# Patient Record
Sex: MALE | Race: BLACK OR AFRICAN AMERICAN | NOT HISPANIC OR LATINO | Employment: OTHER | URBAN - METROPOLITAN AREA
[De-identification: names, ages, dates, MRNs, and addresses within clinical notes are randomized per-mention and may not be internally consistent; named-entity substitution may affect disease eponyms.]

---

## 2017-05-15 ENCOUNTER — TELEMEDICINE (OUTPATIENT)
Dept: TELEMEDICINE | Facility: OTHER | Age: 82
End: 2017-05-15
Payer: OTHER GOVERNMENT

## 2017-05-15 DIAGNOSIS — H53.2 DIPLOPIA: ICD-10-CM

## 2017-05-15 PROCEDURE — 99203 OFFICE O/P NEW LOW 30 MIN: CPT | Mod: GT,,, | Performed by: PSYCHIATRY & NEUROLOGY

## 2017-05-15 NOTE — TELEMEDICINE CONSULT
[facility]/Vascular Neurology  Tele-Consult    Consultation started: 5/15/2017 at 4:06 PM   Consulting Provider:  Dr. Ybarra  The patient location is [facility] Emergency Department  Spoke hospital nurse at bedside with patient assisting consultant.     Subjective:   Subjective   History of Present Illness:  Nick Sellers is a 81 y.o. male who presents with complaints of sever dizziness and diplopia.  There were no clear triggers or precipitation factors. Symtpoms only happen when he stands up and improve when he sits or lays down.  No associated unilateral weakness.  Reports imbalance when that happens but no formal weakness.  Has had in the past and symptoms improve when he sits back down.  Currently he only feels double vision.    Wake up Stroke?: no  Last known normal:   this morining  Recent bleeding noted: no  Does the patient take any Blood Thinners? no           H&P was obtained from patient.  Past Medical History: hypertension, diabetes, high cholesterol, CAD, stroke and heart problems    Medications:   Current Outpatient Prescriptions:     albuterol 90 mcg/actuation inhaler, Inhale 2 puffs into the lungs every 6 (six) hours as needed for Wheezing or Shortness of Breath., Disp: 8.5 g, Rfl: 2    atorvastatin (LIPITOR) 40 MG tablet, Take 1 tablet (40 mg total) by mouth every evening., Disp: 30 tablet, Rfl: 2    chlorthalidone (HYGROTEN) 50 MG Tab, Take 100 mg by mouth once daily., Disp: , Rfl:     clopidogrel (PLAVIX) 75 mg tablet, Take 1 tablet (75 mg total) by mouth once daily., Disp: 30 tablet, Rfl: 2    donepezil (ARICEPT) 10 MG tablet, Take 10 mg by mouth 2 (two) times daily., Disp: , Rfl:     hydrALAZINE (APRESOLINE) 50 MG tablet, Take 50 mg by mouth 2 (two) times daily., Disp: , Rfl:     lisinopril (PRINIVIL,ZESTRIL) 40 MG tablet, Take 80 mg by mouth once daily., Disp: , Rfl:     meclizine (ANTIVERT) 12.5 mg tablet, Take 1 tablet (12.5 mg total) by mouth 3 (three) times daily  as needed for Dizziness., Disp: 90 tablet, Rfl: 2    metformin (GLUCOPHAGE) 500 MG tablet, Take 1 tablet (500 mg total) by mouth 2 (two) times daily with meals., Disp: 60 tablet, Rfl: 2    metoprolol tartrate (LOPRESSOR) 100 MG tablet, Take 100 mg by mouth 2 (two) times daily., Disp: , Rfl:     omega-3 fatty acids-vitamin E (FISH OIL) 1,000 mg Cap, Take 1,000 mg by mouth 2 (two) times daily., Disp: , Rfl:     oxybutynin (DITROPAN) 5 MG Tab, Take 5 mg by mouth 2 (two) times daily., Disp: , Rfl:     Allergies:   Review of patient's allergies indicates:   Allergen Reactions    Pcn [penicillins]      Patient states he has been paralyzed when given PCN in the past        Objective:     Vitals: There were no vitals filed for this visit.     Objective   Physical Exam:  General: well developed, well nourished   HENT: Head:normocephalic and atraumatic   HENT: Ears: right ear normal and left ear normal  Nose: normal nares and no discharge  Eyes:conjunctivae/corneas clear. PERRL.  Neck: normal, no obvious masses and trachea to midline  Lungs: normal respiratory effort  Cardiovascular: Heart: regular rate and rhythm   Cardiovascular: Extremities: no cyanosis, no edema and no clubbing  Abdomen: appears normal and not distended  Skin:  skin color and texture normal, no rash and no bruises  Musculoskeletal: normal range of motion in all extremities  Psych/Behavioral: appropriate affect       Imaging Notes: Abnormal CT chronic changes. Impression performed at: 4:05 pm    NIH Stroke Scale:  Interval: baseline (upon arrival/admit)  Level of Consciousness: 0 - alert  LOC Questions: 0 - answers both correctly  LOC Commands: 0 - performs both correctly  Best Gaze: 0 - normal  Visual: 0 - no visual loss  Facial Palsy: 0 - normal  Motor Left Arm: 0 - no drift  Motor Right Arm: 0 - no drift  Motor Left Le - no drift  Motor Right Le - no drift  Limb Ataxia: 0 - absent  Sensory: 0 - normal  Best Language: 0 - no  aphasia  Dysarthria: 0 - normal articulation  Extinction and Inattention: 0 - no neglect  NIH Stroke Scale Total: 0  Modified Simpson Scale:   Timeline: Prior to symptoms onset  Modified Tacho Score: 0 - no symptoms          Assessment - Diagnosis - Goals:     Plan     Diagnosis/Impression: double vision   Diplopia worse when looking to the left, may represent small brainstem stroke.    Altaplase Recommendation: Altaplase not recommended due to Outside of treament window    Recommendation: NPO until after pass dysphagia screen. Diagnostic studies: HgbA1C to assess blood glucose levels, Lipid Profile to assess cholesterol levels, MRA head to assess vasculature, MRA neck/arch to assess vasculature, MRI head without contrast to assess brain parenchyma, Trans-thoracic cardiac echo to assess cardiac function/status  Consults: Rehab Consult; Occupational Therapy, Rehab Consult; Physical Therapy and Rehab Consult; Speech Therapy  Antithrombotics: Aspirin: 325 mg oral daily  Clopidogrel: 75 mg oral daily  Statins: Atorvastatin- 40 mg oral daily    Disposition Recommendation:  transfer to [facility] by  ground  standard       Possible Interventional Revascularization Candidate? No; No large vessel occlusion    Recommended the emergency room physician to have a brief discussion with the patient and/or family if available regarding the risks and benefits of treatment, and to briefly document the occurrence of that discussion in his clinical encounter note.     The attending portion of this evaluation, treatment, and documentation was performed per Luis Arceo via audiovisual.      Billing code:  (moderate to severe stroke, large areas of edema, some mimics)    · This patient has a critical neurological condition/illness, with high morbidity and mortality.  · There is a high probability for acute neurological change leading to clinical and possibly life-threatening deterioration requiring highest level of  physician preparedness for urgent intervention.  · Care was coordinated with other physicians involved in the patient's care.  · Radiologic studies and laboratory data were reviewed and interpreted, and plan of care was re-assessed based on the results.  · Diagnosis, treatment options and prognosis may have been discussed with the patient and/or family members or caregiver.  Further advanced medical management and further evaluation is warranted for his care.      Consultation ended: 5/15/2017 at 4:15 pm    Luis Arceo MD  Vascular and Interventional Neurology Staff  Director of [facility] Stroke [facility]  [facility]  [phone]

## 2017-05-20 ENCOUNTER — HOSPITAL ENCOUNTER (INPATIENT)
Facility: HOSPITAL | Age: 82
LOS: 3 days | Discharge: HOME HEALTH | DRG: 064 | End: 2017-05-23
Attending: EMERGENCY MEDICINE | Admitting: PSYCHIATRY & NEUROLOGY
Payer: MEDICARE

## 2017-05-20 DIAGNOSIS — I63.22: Primary | ICD-10-CM

## 2017-05-20 DIAGNOSIS — I63.219: Primary | ICD-10-CM

## 2017-05-20 DIAGNOSIS — I63.9 CEREBELLAR INFARCTION: ICD-10-CM

## 2017-05-20 PROBLEM — I63.543: Status: ACTIVE | Noted: 2017-05-20

## 2017-05-20 LAB
ALBUMIN SERPL BCP-MCNC: 4.1 G/DL
ALP SERPL-CCNC: 65 U/L
ALT SERPL W/O P-5'-P-CCNC: 36 U/L
ANION GAP SERPL CALC-SCNC: 13 MMOL/L
AST SERPL-CCNC: 21 U/L
BASOPHILS # BLD AUTO: 0.03 K/UL
BASOPHILS NFR BLD: 0.4 %
BILIRUB SERPL-MCNC: 0.5 MG/DL
BUN SERPL-MCNC: 23 MG/DL
CALCIUM SERPL-MCNC: 10.1 MG/DL
CHLORIDE SERPL-SCNC: 107 MMOL/L
CHOLEST/HDLC SERPL: 4.1 {RATIO}
CO2 SERPL-SCNC: 23 MMOL/L
CREAT SERPL-MCNC: 1.4 MG/DL
DIFFERENTIAL METHOD: NORMAL
EOSINOPHIL # BLD AUTO: 0.4 K/UL
EOSINOPHIL NFR BLD: 5 %
ERYTHROCYTE [DISTWIDTH] IN BLOOD BY AUTOMATED COUNT: 14.3 %
EST. GFR  (AFRICAN AMERICAN): 54.9 ML/MIN/1.73 M^2
EST. GFR  (NON AFRICAN AMERICAN): 47.4 ML/MIN/1.73 M^2
GLUCOSE SERPL-MCNC: 138 MG/DL
HCT VFR BLD AUTO: 45.2 %
HDL/CHOLESTEROL RATIO: 24.6 %
HDLC SERPL-MCNC: 142 MG/DL
HDLC SERPL-MCNC: 35 MG/DL
HGB BLD-MCNC: 15.2 G/DL
LDLC SERPL CALC-MCNC: 66.8 MG/DL
LYMPHOCYTES # BLD AUTO: 2.5 K/UL
LYMPHOCYTES NFR BLD: 31.8 %
MCH RBC QN AUTO: 28.9 PG
MCHC RBC AUTO-ENTMCNC: 33.6 %
MCV RBC AUTO: 86 FL
MONOCYTES # BLD AUTO: 0.7 K/UL
MONOCYTES NFR BLD: 8.2 %
NEUTROPHILS # BLD AUTO: 4.3 K/UL
NEUTROPHILS NFR BLD: 54.2 %
NONHDLC SERPL-MCNC: 107 MG/DL
PLATELET # BLD AUTO: 238 K/UL
PMV BLD AUTO: 10.8 FL
POCT GLUCOSE: 219 MG/DL (ref 70–110)
POTASSIUM SERPL-SCNC: 4 MMOL/L
PROT SERPL-MCNC: 7.8 G/DL
RBC # BLD AUTO: 5.26 M/UL
SODIUM SERPL-SCNC: 143 MMOL/L
TRIGL SERPL-MCNC: 201 MG/DL
TSH SERPL DL<=0.005 MIU/L-ACNC: 1.63 UIU/ML
WBC # BLD AUTO: 7.96 K/UL

## 2017-05-20 PROCEDURE — 80061 LIPID PANEL: CPT

## 2017-05-20 PROCEDURE — 25000003 PHARM REV CODE 250: Performed by: HOSPITALIST

## 2017-05-20 PROCEDURE — 83036 HEMOGLOBIN GLYCOSYLATED A1C: CPT

## 2017-05-20 PROCEDURE — 20600001 HC STEP DOWN PRIVATE ROOM

## 2017-05-20 PROCEDURE — 99223 1ST HOSP IP/OBS HIGH 75: CPT | Mod: ,,, | Performed by: PSYCHIATRY & NEUROLOGY

## 2017-05-20 PROCEDURE — 99285 EMERGENCY DEPT VISIT HI MDM: CPT | Mod: ,,, | Performed by: PHYSICIAN ASSISTANT

## 2017-05-20 PROCEDURE — 80053 COMPREHEN METABOLIC PANEL: CPT

## 2017-05-20 PROCEDURE — 63600175 PHARM REV CODE 636 W HCPCS: Performed by: HOSPITALIST

## 2017-05-20 PROCEDURE — 99285 EMERGENCY DEPT VISIT HI MDM: CPT

## 2017-05-20 PROCEDURE — 85025 COMPLETE CBC W/AUTO DIFF WBC: CPT

## 2017-05-20 PROCEDURE — 84443 ASSAY THYROID STIM HORMONE: CPT

## 2017-05-20 RX ORDER — HYDRALAZINE HYDROCHLORIDE 20 MG/ML
10 INJECTION INTRAMUSCULAR; INTRAVENOUS EVERY 6 HOURS PRN
Status: DISCONTINUED | OUTPATIENT
Start: 2017-05-20 | End: 2017-05-21

## 2017-05-20 RX ORDER — AMLODIPINE BESYLATE 10 MG/1
10 TABLET ORAL DAILY
COMMUNITY

## 2017-05-20 RX ORDER — SPIRONOLACTONE AND HYDROCHLOROTHIAZIDE 25; 25 MG/1; MG/1
1 TABLET ORAL DAILY
Status: ON HOLD | COMMUNITY
End: 2017-05-21

## 2017-05-20 RX ORDER — GLUCAGON 1 MG
1 KIT INJECTION
Status: DISCONTINUED | OUTPATIENT
Start: 2017-05-20 | End: 2017-05-23 | Stop reason: HOSPADM

## 2017-05-20 RX ORDER — HYDRALAZINE HYDROCHLORIDE 50 MG/1
50 TABLET, FILM COATED ORAL 3 TIMES DAILY
Status: ON HOLD | COMMUNITY
End: 2017-05-23 | Stop reason: HOSPADM

## 2017-05-20 RX ORDER — SODIUM CHLORIDE 0.9 % (FLUSH) 0.9 %
3 SYRINGE (ML) INJECTION EVERY 8 HOURS
Status: DISCONTINUED | OUTPATIENT
Start: 2017-05-20 | End: 2017-05-23 | Stop reason: HOSPADM

## 2017-05-20 RX ORDER — ASPIRIN 325 MG
325 TABLET, DELAYED RELEASE (ENTERIC COATED) ORAL DAILY
COMMUNITY
End: 2017-06-13

## 2017-05-20 RX ORDER — CARVEDILOL 25 MG/1
25 TABLET ORAL 2 TIMES DAILY WITH MEALS
COMMUNITY

## 2017-05-20 RX ORDER — INSULIN ASPART 100 [IU]/ML
0-5 INJECTION, SOLUTION INTRAVENOUS; SUBCUTANEOUS EVERY 6 HOURS PRN
Status: DISCONTINUED | OUTPATIENT
Start: 2017-05-20 | End: 2017-05-23 | Stop reason: HOSPADM

## 2017-05-20 RX ORDER — ATORVASTATIN CALCIUM 20 MG/1
40 TABLET, FILM COATED ORAL NIGHTLY
Status: DISCONTINUED | OUTPATIENT
Start: 2017-05-20 | End: 2017-05-23 | Stop reason: HOSPADM

## 2017-05-20 RX ADMIN — INSULIN ASPART 1 UNITS: 100 INJECTION, SOLUTION INTRAVENOUS; SUBCUTANEOUS at 09:05

## 2017-05-20 RX ADMIN — SODIUM CHLORIDE, PRESERVATIVE FREE 3 ML: 5 INJECTION INTRAVENOUS at 04:05

## 2017-05-20 RX ADMIN — ATORVASTATIN CALCIUM 40 MG: 20 TABLET, FILM COATED ORAL at 08:05

## 2017-05-20 RX ADMIN — SODIUM CHLORIDE, PRESERVATIVE FREE 3 ML: 5 INJECTION INTRAVENOUS at 08:05

## 2017-05-20 NOTE — ED TRIAGE NOTES
PT reports he went to [facility] for unsteadiness and falling[facility] Pt reports he was discharged with a walker. Pt reports he is still unbalanced and falling. Pt reports his vision is blurred.

## 2017-05-20 NOTE — SUBJECTIVE & OBJECTIVE
Past Medical History:   Diagnosis Date    CAD S/P percutaneous coronary angioplasty     Colon cancer     received radiation    Diabetes mellitus     High cholesterol 12/3/13    Hyperlipidemia     Hypertension     Prostate cancer      Past Surgical History:   Procedure Laterality Date    CORONARY ANGIOPLASTY WITH STENT PLACEMENT      1998, 2001     Family History   Problem Relation Age of Onset    Throat cancer Mother     Prostate cancer Father     Diabetes Mellitus Brother     Diabetes Mellitus Sister     Stroke Neg Hx      Social History   Substance Use Topics    Smoking status: Former Smoker     Quit date: 12/6/1998    Smokeless tobacco: None    Alcohol use No     Review of patient's allergies indicates:   Allergen Reactions    Pcn [penicillins]      Patient states he has been paralyzed when given PCN in the past      Medications: I have not reviewed the current medication administration record.      (Not in a hospital admission)    Review of Systems   Constitutional: Negative for chills and fever.   HENT: Negative for congestion and rhinorrhea.    Eyes: Negative for discharge and redness.   Respiratory: Negative for cough and shortness of breath.    Cardiovascular: Negative for chest pain and palpitations.   Gastrointestinal: Negative for nausea and vomiting.   Genitourinary: Negative for dysuria and hematuria.   Musculoskeletal: Negative for myalgias and neck pain.   Skin: Negative for rash and wound.   Neurological: Positive for dizziness, facial asymmetry and weakness. Negative for speech difficulty, light-headedness, numbness and headaches.   Psychiatric/Behavioral: Negative for agitation and confusion.     Objective:     Vital Signs (Most Recent):  Temp: 97.8 °F (36.6 °C) (05/20/17 1011)  Pulse: (!) 59 (05/20/17 1202)  Resp: 17 (05/20/17 1202)  BP: 139/68 (05/20/17 1202)  SpO2: 99 % (05/20/17 1202)    Vital Signs Range (Last 24H):  Temp:  [97.8 °F (36.6 °C)]   Pulse:  [59-62]   Resp:   [17-18]   BP: (139-165)/(68-73)   SpO2:  [99 %-100 %]     Physical Exam   Constitutional: He is oriented to person, place, and time. He appears well-developed and well-nourished. No distress.   HENT:   Head: Normocephalic and atraumatic.   Eyes: EOM are normal. Pupils are equal, round, and reactive to light.   Neck: Normal range of motion. Neck supple.   Cardiovascular: Normal rate and regular rhythm.    Pulmonary/Chest: Effort normal. No respiratory distress.   Abdominal: Soft. He exhibits no distension.   Musculoskeletal: He exhibits no edema or tenderness.   Neurological: He is alert and oriented to person, place, and time. A cranial nerve deficit is present. Coordination normal. GCS eye subscore is 4 - spontaneous. GCS verbal subscore is 5 - oriented. GCS motor subscore is 6 - obeys commands.   Skin: Skin is warm and dry. He is not diaphoretic.       Neurological Exam:   LOC: alert and follows requests  Language: No aphasia  Speech: No dysarthria  Orientation: Person, Place, Time  Memory: Recent memory intact, Remote memory intact, Age correct, Month correct  Visual Fields (CN II): Full  EOM (CN III, IV, VI): Full/intact  Pupils (CN III, IV, VI): PERRL  Facial Sensation (CN V): Symmetric   Facial Movement (CN VII): upper weakness left upper and left lower and lower weakness left upper and left lower  Tongue (CN XII): to midline  Motor*: Arm Left:  Normal (5/5), Leg Left:   Normal (5/5), Arm Right:   Normal (5/5), Leg Right:   Normal (5/5)  Cerebellar*: Normal limb  Sensation: intact to light touch, pain  Tone: Arm-Left: normal; Leg-Left: normal; Arm-Right: normal; Leg-Right: normal    Stroke Team Times:   Last Known Normal Date and Time : 5/16/2017  Symptom Onset Date and Time:5/16/2017    NIH Stroke Scale:  Interval: baseline (upon arrival/admit)  Level of Consciousness: 0 - alert  LOC Questions: 0 - answers both correctly  LOC Commands: 0 - performs both correctly  Best Gaze: 0 - normal  Visual: 0 - no visual  loss  Facial Palsy: 2 - partial  Motor Left Arm: 0 - no drift  Motor Right Arm: 0 - no drift  Motor Left Le - no drift  Motor Right Le - no drift  Limb Ataxia: 0 - absent  Sensory: 0 - normal  Best Language: 0 - no aphasia  Dysarthria: 0 - normal articulation  Extinction and Inattention: 0 - no neglect  NIH Stroke Scale Total: 2  Catherine Coma Scale:  Best Eye Response: 4 - spontaneous  Best Motor Response: 6 - obeys commands  Best Verbal Response: 5 - oriented  [place] Coma Scale Total: 15  Modified [place] Scale:   Timeline: Prior to symptoms onset  Modified [place] Score: 2 - slight disability        Laboratory:  CMP:   Recent Labs  Lab 17  1224   CALCIUM 10.1   ALBUMIN 4.1   PROT 7.8      K 4.0   CO2 23      BUN 23   CREATININE 1.4   ALKPHOS 65   ALT 36   AST 21   BILITOT 0.5     CBC:   Recent Labs  Lab 17  1224   WBC 7.96   RBC 5.26   HGB 15.2   HCT 45.2      MCV 86   MCH 28.9   MCHC 33.6       Diagnostic Results:  Brain Imaging: MRI Head. Pending

## 2017-05-20 NOTE — ASSESSMENT & PLAN NOTE
80 y/o PMH HTN, DM2, HLD, remote right carona radiata infarct w/o residual deficits who presents with chief complaint of falls. Admitted to [place] with acute bilateral cerebellar infarcts. No tPA given 2/2 out of window and no thrombectomy recommended by Telemedicine at the time given no large vessel dz. Discharged home with possible worsening of balance issues though may be due to previous CVA. Not a candidate for tPA given recent CVA    Antithrombotics: holding till MRI to r/o hemorrhagic conversion, then likely start ASA 325mg, plavix  Statins: Lipitor 40mg qd  PT/OT/SLP/Rehab  MRI Brain pending  2D ECHO pending  TSH, Lipid, A1c pending  Admit to Vascular Neurology

## 2017-05-20 NOTE — ED NOTES
APPEARANCE: awake and alert in NAD.  SKIN: warm, dry and intact. No breakdown or bruising.  MUSCULOSKELETAL: Patient moving all extremities spontaneously, no obvious swelling or deformities noted.  RESPIRATORY: no shortness of breath. All breath sounds CTA bilaterally.  CARDIAC: heart tones normal. Regular rate and rhythm; 2+ distal pulses; no peripheral edema  ABDOMEN: S/ND/NT, normoactive bowel sounds present in all four quadrants. Normal stool pattern.  : voids spontaneously without difficulty.  Neurologic: AAO x 4; follows commands equal strength in all extremities; denies numbness/tingling. Left sided facial droop. Left forehead paralysis.

## 2017-05-20 NOTE — H&P
[facility]  Vascular Neurology  Comprehensive Stroke Center  History & Physical    Subjective:     History of Present Illness:  82 y/o PMH HTN, DM2, HLD, remote right carona radiata infarct w/o residual deficits who presents with chief complaint of falls. He reports acute onset blurry vision, imbalance on Tuesday 05/16 for with some left sided facial droop for which he presented to [facility]. There he was found to have intact strength and sensation but left sided facial drooping. MRI findings notable for extensive small vessel WM dz, multiple old deep WM lacunar infarcts LUCRECIA but no acute ischemic changes in the cortex. No bleeding. Did note several small cortical infarcts. An MRA brain suggested distal vertebral artery high-grade stenosis. He was seen by PT/OT who recommended outpatient PT and provided a walker. His family reports since he got home he has had worsening of his balance problems but no other neurologic findings. Has had some falls as well. No new vision changes, focal weakness, slurring speech, or worsening of his facial droop         Past Medical History:   Diagnosis Date    CAD S/P percutaneous coronary angioplasty     Colon cancer     received radiation    Diabetes mellitus     High cholesterol 12/3/13    Hyperlipidemia     Hypertension     Prostate cancer      Past Surgical History:   Procedure Laterality Date    CORONARY ANGIOPLASTY WITH STENT PLACEMENT      1998, 2001     Family History   Problem Relation Age of Onset    Throat cancer Mother     Prostate cancer Father     Diabetes Mellitus Brother     Diabetes Mellitus Sister     Stroke Neg Hx      Social History   Substance Use Topics    Smoking status: Former Smoker     Quit date: 12/6/1998    Smokeless tobacco: None    Alcohol use No     Review of patient's allergies indicates:   Allergen Reactions    Pcn [penicillins]      Patient states he has been paralyzed when given PCN in the past       Medications: I have not reviewed the current medication administration record.      (Not in a hospital admission)    Review of Systems   Constitutional: Negative for chills and fever.   HENT: Negative for congestion and rhinorrhea.    Eyes: Negative for discharge and redness.   Respiratory: Negative for cough and shortness of breath.    Cardiovascular: Negative for chest pain and palpitations.   Gastrointestinal: Negative for nausea and vomiting.   Genitourinary: Negative for dysuria and hematuria.   Musculoskeletal: Negative for myalgias and neck pain.   Skin: Negative for rash and wound.   Neurological: Positive for dizziness, facial asymmetry and weakness. Negative for speech difficulty, light-headedness, numbness and headaches.   Psychiatric/Behavioral: Negative for agitation and confusion.     Objective:     Vital Signs (Most Recent):  Temp: 97.8 °F (36.6 °C) (05/20/17 1011)  Pulse: (!) 59 (05/20/17 1202)  Resp: 17 (05/20/17 1202)  BP: 139/68 (05/20/17 1202)  SpO2: 99 % (05/20/17 1202)    Vital Signs Range (Last 24H):  Temp:  [97.8 °F (36.6 °C)]   Pulse:  [59-62]   Resp:  [17-18]   BP: (139-165)/(68-73)   SpO2:  [99 %-100 %]     Physical Exam   Constitutional: He is oriented to person, place, and time. He appears well-developed and well-nourished. No distress.   HENT:   Head: Normocephalic and atraumatic.   Eyes: EOM are normal. Pupils are equal, round, and reactive to light.   Neck: Normal range of motion. Neck supple.   Cardiovascular: Normal rate and regular rhythm.    Pulmonary/Chest: Effort normal. No respiratory distress.   Abdominal: Soft. He exhibits no distension.   Musculoskeletal: He exhibits no edema or tenderness.   Neurological: He is alert and oriented to person, place, and time. A cranial nerve deficit is present. Coordination normal. GCS eye subscore is 4 - spontaneous. GCS verbal subscore is 5 - oriented. GCS motor subscore is 6 - obeys commands.   Skin: Skin is warm and dry. He is not  diaphoretic.       Neurological Exam:   LOC: alert and follows requests  Language: No aphasia  Speech: No dysarthria  Orientation: Person, Place, Time  Memory: Recent memory intact, Remote memory intact, Age correct, Month correct  Visual Fields (CN II): Full  EOM (CN III, IV, VI): Full/intact  Pupils (CN III, IV, VI): PERRL  Facial Sensation (CN V): Symmetric   Facial Movement (CN VII): upper weakness left upper and left lower and lower weakness left upper and left lower  Tongue (CN XII): to midline  Motor*: Arm Left:  Normal (5/5), Leg Left:   Normal (5/5), Arm Right:   Normal (5/5), Leg Right:   Normal (5/5)  Cerebellar*: Normal limb  Sensation: intact to light touch, pain  Tone: Arm-Left: normal; Leg-Left: normal; Arm-Right: normal; Leg-Right: normal    Stroke Team Times:   Last Known Normal Date and Time : 2017  Symptom Onset Date and Time:2017    NIH Stroke Scale:  Interval: baseline (upon arrival/admit)  Level of Consciousness: 0 - alert  LOC Questions: 0 - answers both correctly  LOC Commands: 0 - performs both correctly  Best Gaze: 0 - normal  Visual: 0 - no visual loss  Facial Palsy: 2 - partial  Motor Left Arm: 0 - no drift  Motor Right Arm: 0 - no drift  Motor Left Le - no drift  Motor Right Le - no drift  Limb Ataxia: 0 - absent  Sensory: 0 - normal  Best Language: 0 - no aphasia  Dysarthria: 0 - normal articulation  Extinction and Inattention: 0 - no neglect  NIH Stroke Scale Total: 2  [place] Coma Scale:  Best Eye Response: 4 - spontaneous  Best Motor Response: 6 - obeys commands  Best Verbal Response: 5 - oriented  Catherine Coma Scale Total: 15  Modified Tacho Scale:   Timeline: Prior to symptoms onset  Modified [place] Score: 2 - slight disability        Laboratory:  CMP:   Recent Labs  Lab 17  1224   CALCIUM 10.1   ALBUMIN 4.1   PROT 7.8      K 4.0   CO2 23      BUN 23   CREATININE 1.4   ALKPHOS 65   ALT 36   AST 21   BILITOT 0.5     CBC:   Recent Labs  Lab  05/20/17  1224   WBC 7.96   RBC 5.26   HGB 15.2   HCT 45.2      MCV 86   MCH 28.9   MCHC 33.6       Diagnostic Results:  Brain Imaging: MRI Head. Pending    Assessment/Plan:     Patient is a 81 y.o. 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 Admitted to [place] with acute bilateral cerebellar infarcts. No tPA given 2/2 out of window and no thrombectomy recommended by Telemedicine at the time given no large vessel dz. Discharged home with possible worsening of balance issues though may be due to previous CVA. Not a candidate for tPA given recent CVA    Antithrombotics: holding till MRI to r/o hemorrhagic conversion, then likely start ASA 325mg, plavix  Statins: Lipitor 40mg qd  PT/OT/SLP/Rehab  MRI Brain pending  2D ECHO pending  TSH, Lipid, A1c pending  Admit to Vascular Neurology    Hypertension  Stroke risk factor  -Holding home lisinopril 20mg qd  -Holding home coreg 25mg BID  -Holding home spironolactone 25mg qd  -Holding home norvasc 10mg qd  -Holding home hydralazine 50mg TID  -Permissive HTN SBP <220 for now, if MRI negative can lower to SBP <180 and likely restart home meds    HLD (hyperlipidemia)  Stroke risk factor  -Continue atorvastatin 40mg qd    CAD S/P percutaneous coronary angioplasty  -Restart aspirin/plavix if no bleed or new CVA    Diabetes mellitus, type 2  Stroke risk factor  -A1c pending  -Only on metformin 500mg po BID at home  -LDSSI    Cerebral aneurysm, nonruptured  -3mm PCOM aneurysm from OSH imaging, also present on 2013 imaging (MRA)      Thrombolysis Candidate? No  1. Contraindications: Outside of treament window and Recent intracranial or spinal surgery, head trauma, or stroke (< 3 months    Interventional Revascularization Candidate?  No; No large vessel occlusion    Moshe FITZGERALD  Jerson DAVIS MD  Comprehensive Stroke Center  Department of Vascular Neurology   [facility]

## 2017-05-20 NOTE — ASSESSMENT & PLAN NOTE
Stroke risk factor  -Holding home lisinopril 20mg qd  -Holding home coreg 25mg BID  -Holding home spironolactone 25mg qd  -Holding home norvasc 10mg qd  -Holding home hydralazine 50mg TID  -Permissive HTN SBP <220 for now, if MRI negative can lower to SBP <180 and likely restart home meds

## 2017-05-20 NOTE — ED PROVIDER NOTES
"Encounter Date: 5/20/2017    SCRIBE #1 NOTE: I, Komal Wang , am scribing for, and in the presence of, Dr. Neves .       History     Chief Complaint   Patient presents with    Fatigue     weak, dizzy, and multiple falls since Thursday; pt states "I cant control my movements."      Review of patient's allergies indicates:   Allergen Reactions    Pcn [penicillins]      Patient states he has been paralyzed when given PCN in the past      HPI Comments: This is a 81 year old male with a PMH of HTN, DM, CAD, stroke, colon ca who presents to the ED with a chief complaint of weakness. Patient is present with his daughter and niece. He was admitted at [facility] 5/16-18 after he began with diplopia, lightheadedness, and ataxia. He was noted to have left sided facial droop. He was discharged home but continues to have difficulty with balance. Family reports him having frequent falls and has noticed a tendency to lean towards the left. Patient lives at home with his wife who he cares for, as she has alzheimer's. He continues to endorse blurry vision and generalized weakness. Denies fever, chills, URI symptoms, chest pain, SOB, nausea/vomiting, abdominal pain, changes in urination, numbness, speech changes. He is a former smoker.    The history is provided by the patient.     Past Medical History:   Diagnosis Date    CAD S/P percutaneous coronary angioplasty     Colon cancer     received radiation    Diabetes mellitus     High cholesterol 12/3/13    Hyperlipidemia     Hypertension     Prostate cancer      Past Surgical History:   Procedure Laterality Date    CORONARY ANGIOPLASTY WITH STENT PLACEMENT      1998, 2001     Family History   Problem Relation Age of Onset    Throat cancer Mother     Prostate cancer Father     Diabetes Mellitus Brother     Diabetes Mellitus Sister     Stroke Neg Hx      Social History   Substance Use Topics    Smoking status: Former Smoker     Quit date: 12/6/1998    Smokeless " tobacco: None    Alcohol use No     Review of Systems   Constitutional: Negative for fever.   HENT: Negative for sore throat.    Respiratory: Negative for shortness of breath.    Cardiovascular: Negative for chest pain.   Gastrointestinal: Negative for abdominal pain, nausea and vomiting.   Genitourinary: Negative for dysuria.   Musculoskeletal: Negative for back pain.   Skin: Negative for rash.   Neurological: Positive for facial asymmetry, weakness and light-headedness. Negative for seizures, speech difficulty and numbness.   Hematological: Does not bruise/bleed easily.       Physical Exam   Initial Vitals   BP Pulse Resp Temp SpO2   05/20/17 1011 05/20/17 1011 05/20/17 1011 05/20/17 1011 05/20/17 1011   165/73 62 18 97.8 °F (36.6 °C) 100 %     Physical Exam    Constitutional: He appears well-developed and well-nourished. No distress.   HENT:   Head: Normocephalic and atraumatic.   Right Ear: Tympanic membrane and ear canal normal.   Left Ear: Tympanic membrane and ear canal normal.   Eyes: Conjunctivae and EOM are normal. Pupils are equal, round, and reactive to light.   Neck: Normal range of motion. Neck supple.   Cardiovascular: Normal rate, regular rhythm and normal heart sounds.   Pulmonary/Chest: Breath sounds normal. No respiratory distress. He has no wheezes. He has no rhonchi. He has no rales.   Abdominal: Soft. Bowel sounds are normal. There is no tenderness. There is no rebound and no guarding.   Neurological: He is alert and oriented to person, place, and time. He has normal strength. No sensory deficit.   Left facial droop.  Tongue is midline. Normal shoulder shrug.  Cerebellar exam - finger to nose, heel to shin normal.  No pronator drift. Gait assessment deferred.   Skin: Skin is warm and dry. No rash noted.         ED Course   Procedures  Labs Reviewed   COMPREHENSIVE METABOLIC PANEL - Abnormal; Notable for the following:        Result Value    Glucose 138 (*)     eGFR if African American 54.9 (*)      eGFR if non  47.4 (*)     All other components within normal limits   LIPID PANEL - Abnormal; Notable for the following:     Triglycerides 201 (*)     HDL 35 (*)     All other components within normal limits    Narrative:     ADD ON TEST GHGB [id number] PER DR LUDIVINA IVY IV, MD 5/20/17 13:28  ADD-ON TSH #[id number]; LIPID PANEL #[id number] PER USMAN IVY IV, MD 13:34  05/20/2017    CBC W/ AUTO DIFFERENTIAL   LIPID PANEL   HEMOGLOBIN A1C   TSH   TSH    Narrative:     ADD ON TEST GHGB [id number] PER DR LUDIVINA IVY IV, MD 5/20/17 13:28  ADD-ON TSH #[id number]; LIPID PANEL #[id number] PER USMAN IVY IV, MD 13:34  05/20/2017    HEMOGLOBIN A1C    Narrative:     ADD ON TEST GHGB [id number] PER DR LUDIVINA IVY IV, MD 5/20/17 13:28   POCT GLUCOSE MONITORING CONTINUOUS     Imaging Results     None             Imaging Results     None             Medical Decision Making:   History:   Old Medical Records: I decided to obtain old medical records.  Clinical Tests:   Lab Tests: Ordered and Reviewed  Radiological Study: Ordered and Reviewed       APC / Resident Notes:   81 year old male presents with generalized weakness and frequent falls following recent admission for stroke evaluation.  On exam he is afebrile and nontoxic. Neuro exam reveals facial asymmetry with left sided facial droop. Otherwise nonfocal exam.    I discussed this case with vascular neurology.   Basic labs are pending.  Anticipate admission for further evaluation and likely inpatient rehabilitation.       Scribe Attestation:   Scribe #1: I performed the above scribed service and the documentation accurately describes the services I performed. I attest to the accuracy of the note.    Attending Attestation:     Physician Attestation Statement for NP/PA:   I discussed this assessment and plan of this patient with the NP/PA, but I did not personally examine the patient. The face to face encounter was performed by the NP/PA.    Other NP/PA Attestation  Additions:    History of Present Illness: 81 y.o. male, with history of recent stroke presents for evaluation of weakness and gait disturbance.          Physician Attestation for Scribe:  Physician Attestation Statement for Scribe #1: I, Dr. Neves, reviewed documentation, as scribed by Komal Wang  in my presence, and it is both accurate and complete.                 ED Course     Clinical Impression:   The encounter diagnosis was Cerebellar infarction.    Disposition:   Disposition: Admitted  Condition: Fair       ZOIE Zazueta  05/20/17 [id number]

## 2017-05-21 PROBLEM — E66.9 NON MORBID OBESITY: Status: ACTIVE | Noted: 2017-05-21

## 2017-05-21 PROBLEM — I63.211 STROKE DUE TO STENOSIS OF RIGHT VERTEBRAL ARTERY: Status: ACTIVE | Noted: 2017-05-21

## 2017-05-21 PROBLEM — I63.89 ACUTE BRAINSTEM INFARCTION: Status: ACTIVE | Noted: 2017-05-21

## 2017-05-21 LAB
ALBUMIN SERPL BCP-MCNC: 3.9 G/DL
ALP SERPL-CCNC: 62 U/L
ALT SERPL W/O P-5'-P-CCNC: 30 U/L
ANION GAP SERPL CALC-SCNC: 12 MMOL/L
AST SERPL-CCNC: 17 U/L
BASOPHILS # BLD AUTO: 0.04 K/UL
BASOPHILS NFR BLD: 0.5 %
BILIRUB SERPL-MCNC: 0.5 MG/DL
BUN SERPL-MCNC: 18 MG/DL
CALCIUM SERPL-MCNC: 10 MG/DL
CHLORIDE SERPL-SCNC: 105 MMOL/L
CO2 SERPL-SCNC: 25 MMOL/L
CREAT SERPL-MCNC: 1 MG/DL
DIFFERENTIAL METHOD: NORMAL
EOSINOPHIL # BLD AUTO: 0.5 K/UL
EOSINOPHIL NFR BLD: 6.5 %
ERYTHROCYTE [DISTWIDTH] IN BLOOD BY AUTOMATED COUNT: 14.1 %
EST. GFR  (AFRICAN AMERICAN): >60 ML/MIN/1.73 M^2
EST. GFR  (NON AFRICAN AMERICAN): >60 ML/MIN/1.73 M^2
ESTIMATED AVG GLUCOSE: 163 MG/DL
GLUCOSE SERPL-MCNC: 110 MG/DL
HBA1C MFR BLD HPLC: 7.3 %
HCT VFR BLD AUTO: 44.8 %
HGB BLD-MCNC: 14.9 G/DL
LYMPHOCYTES # BLD AUTO: 2.4 K/UL
LYMPHOCYTES NFR BLD: 32.4 %
MCH RBC QN AUTO: 28.6 PG
MCHC RBC AUTO-ENTMCNC: 33.3 %
MCV RBC AUTO: 86 FL
MONOCYTES # BLD AUTO: 0.7 K/UL
MONOCYTES NFR BLD: 9.5 %
NEUTROPHILS # BLD AUTO: 3.8 K/UL
NEUTROPHILS NFR BLD: 50.7 %
PLATELET # BLD AUTO: 227 K/UL
PMV BLD AUTO: 11.2 FL
POCT GLUCOSE: 132 MG/DL (ref 70–110)
POCT GLUCOSE: 135 MG/DL (ref 70–110)
POCT GLUCOSE: 138 MG/DL (ref 70–110)
POTASSIUM SERPL-SCNC: 4.5 MMOL/L
PROT SERPL-MCNC: 7.1 G/DL
RBC # BLD AUTO: 5.21 M/UL
SODIUM SERPL-SCNC: 142 MMOL/L
WBC # BLD AUTO: 7.54 K/UL

## 2017-05-21 PROCEDURE — 97530 THERAPEUTIC ACTIVITIES: CPT

## 2017-05-21 PROCEDURE — 85025 COMPLETE CBC W/AUTO DIFF WBC: CPT

## 2017-05-21 PROCEDURE — 92523 SPEECH SOUND LANG COMPREHEN: CPT

## 2017-05-21 PROCEDURE — 25000003 PHARM REV CODE 250: Performed by: NURSE PRACTITIONER

## 2017-05-21 PROCEDURE — 80053 COMPREHEN METABOLIC PANEL: CPT

## 2017-05-21 PROCEDURE — 25000003 PHARM REV CODE 250: Performed by: HOSPITALIST

## 2017-05-21 PROCEDURE — G8996 SWALLOW CURRENT STATUS: HCPCS | Mod: CI

## 2017-05-21 PROCEDURE — 97165 OT EVAL LOW COMPLEX 30 MIN: CPT

## 2017-05-21 PROCEDURE — 99233 SBSQ HOSP IP/OBS HIGH 50: CPT | Mod: ,,, | Performed by: PSYCHIATRY & NEUROLOGY

## 2017-05-21 PROCEDURE — 36415 COLL VENOUS BLD VENIPUNCTURE: CPT

## 2017-05-21 PROCEDURE — 97161 PT EVAL LOW COMPLEX 20 MIN: CPT

## 2017-05-21 PROCEDURE — G8997 SWALLOW GOAL STATUS: HCPCS | Mod: CH

## 2017-05-21 PROCEDURE — 92610 EVALUATE SWALLOWING FUNCTION: CPT

## 2017-05-21 PROCEDURE — 20600001 HC STEP DOWN PRIVATE ROOM

## 2017-05-21 RX ORDER — CLOPIDOGREL BISULFATE 75 MG/1
75 TABLET ORAL DAILY
Status: DISCONTINUED | OUTPATIENT
Start: 2017-05-21 | End: 2017-05-23 | Stop reason: HOSPADM

## 2017-05-21 RX ORDER — AMLODIPINE BESYLATE 10 MG/1
10 TABLET ORAL DAILY
Status: DISCONTINUED | OUTPATIENT
Start: 2017-05-21 | End: 2017-05-23 | Stop reason: HOSPADM

## 2017-05-21 RX ORDER — ACETAMINOPHEN 325 MG/1
650 TABLET ORAL EVERY 6 HOURS PRN
Status: DISCONTINUED | OUTPATIENT
Start: 2017-05-21 | End: 2017-05-23 | Stop reason: HOSPADM

## 2017-05-21 RX ORDER — SPIRONOLACTONE 25 MG/1
25 TABLET ORAL DAILY
COMMUNITY

## 2017-05-21 RX ORDER — HYDRALAZINE HYDROCHLORIDE 20 MG/ML
10 INJECTION INTRAMUSCULAR; INTRAVENOUS EVERY 6 HOURS PRN
Status: DISCONTINUED | OUTPATIENT
Start: 2017-05-21 | End: 2017-05-23 | Stop reason: HOSPADM

## 2017-05-21 RX ORDER — ASPIRIN 325 MG
325 TABLET, DELAYED RELEASE (ENTERIC COATED) ORAL DAILY
Status: DISCONTINUED | OUTPATIENT
Start: 2017-05-21 | End: 2017-05-23 | Stop reason: HOSPADM

## 2017-05-21 RX ORDER — CARVEDILOL 25 MG/1
25 TABLET ORAL 2 TIMES DAILY WITH MEALS
Status: DISCONTINUED | OUTPATIENT
Start: 2017-05-21 | End: 2017-05-23 | Stop reason: HOSPADM

## 2017-05-21 RX ADMIN — SODIUM CHLORIDE, PRESERVATIVE FREE 3 ML: 5 INJECTION INTRAVENOUS at 04:05

## 2017-05-21 RX ADMIN — AMLODIPINE BESYLATE 10 MG: 10 TABLET ORAL at 09:05

## 2017-05-21 RX ADMIN — CLOPIDOGREL 75 MG: 75 TABLET, FILM COATED ORAL at 01:05

## 2017-05-21 RX ADMIN — SODIUM CHLORIDE, PRESERVATIVE FREE 3 ML: 5 INJECTION INTRAVENOUS at 09:05

## 2017-05-21 RX ADMIN — SODIUM CHLORIDE, PRESERVATIVE FREE 3 ML: 5 INJECTION INTRAVENOUS at 01:05

## 2017-05-21 RX ADMIN — ACETAMINOPHEN 650 MG: 325 TABLET ORAL at 09:05

## 2017-05-21 RX ADMIN — ACETAMINOPHEN 650 MG: 325 TABLET ORAL at 04:05

## 2017-05-21 RX ADMIN — CARVEDILOL 25 MG: 25 TABLET, FILM COATED ORAL at 05:05

## 2017-05-21 RX ADMIN — ASPIRIN 325 MG: 325 TABLET, DELAYED RELEASE ORAL at 01:05

## 2017-05-21 RX ADMIN — CARVEDILOL 25 MG: 25 TABLET, FILM COATED ORAL at 09:05

## 2017-05-21 RX ADMIN — ATORVASTATIN CALCIUM 40 MG: 20 TABLET, FILM COATED ORAL at 09:05

## 2017-05-21 NOTE — PT/OT/SLP EVAL
"Speech Language Pathology Evaluation    Nick Sellers   MRN: [id number]   Admitting Diagnosis: Cerebral infarction due to unspecified occlusion or stenosis of bilateral cerebellar arteries    Diet recommendations: Solid Diet Level: Regular  Liquid Diet Level: Thin   Aspiration Precautions:  HOB to 90 degrees, Small bites/sips, Check for pocketing/oral residue and Meds whole 1 at a time    SLP Treatment Date: 17  Speech Start Time: 911     Speech Stop Time: 945     Speech Total (min): 34 min       TREATMENT BILLABLE MINUTES:  Eval 20  and Eval Swallow and Oral Function 14    Diagnosis: Cerebral infarction due to unspecified occlusion or stenosis of bilateral cerebellar arteries    Past Medical History:   Diagnosis Date    CAD S/P percutaneous coronary angioplasty     Colon cancer     received radiation    Diabetes mellitus     High cholesterol 12/3/13    Hyperlipidemia     Hypertension     Prostate cancer      Past Surgical History:   Procedure Laterality Date    CORONARY ANGIOPLASTY WITH STENT PLACEMENT      ,        Has the patient been evaluated by SLP for swallowing? : Yes  Keep patient NPO?: No   General Precautions: Standard,            Social Hx: Patient lives with wife, daughter and grand-daughter    Prior diet: Regular    Occupational/hobbies/homemaking: Reports independent pta.  Driving.  Volunteering at [employer] at times.     Subjective:  "the speech is a little off" (pt stated)    Pain Ratin/10  Pain Rating Post-Intervention: 0/10    Objective:        Oral Musculature Evaluation  Oral Musculature: left weakness  Dentition: present and adequate  Mucosal Quality: adequate  Mandibular Strength and Mobility: impaired  Oral Labial Strength and Mobility: impaired retraction  Lingual Strength and Mobility: WFL  Volitional Cough: adequate  Voice Prior to PO Intake: clear     Cognitive Status:  Behavioral Observations: alert and cooperative-  Memory and Orientation: Mild deficits noted.  Pt " was oriented to self, place, situation and all aspects of time but the year .  General information was recalled with 60% accuracy.  Pt immediately recalled 5/5 digits and 5/5 words.  Following a delay, pt recalled 2/3 words given cues.   Attention: Adequate.  Problem Solving: WFL for basic hypothetical situations.  Pt was able to contrast, but had difficulty comparing two objects.  He responded to categorization tasks with 80% accuracy.      Language:   Auditory Comprehension: WFL.  Responded to complex y/n questions with 100% accuracy and followed complete command.    Verbal Expression: WFL.  No overt word finding difficulty detected or reported.  Pt named common objects with 100% accuracy.  Word fluency skills were reduced as pt listed 10 items in one minute when 15-20 is considered wnl.     Motor Speech: mild dysarthria    Voice: WFL    Augmentative Alternative Communication: n/a    Reading: to be assessed.    Writing: to be assessed.    Visual-Spatial: to be assessed.  Pt reports continued blurred vision.    Bedside Swallow Eval:  Consistencies Assessed: Thin liquids (cup/straw sips - 4 oz) and Solids (pack of crackers)  Oral Phase: WFL; risk for pocketing with left facial weakness.  Reviewed strategies.   Pharyngeal Phase: no overt clinical  signs/symptoms of aspiration    Additional Treatment:    Education provided to pt regarding ST role/assessment.  Reviewed swallow precautions, s/s of aspiration and strategies to reduce risk for pocketing in left cheek.  Further reviewed results of cognitive/linguistic assessment and continued plan of care.  No family present at time of session.       FIM:  Social Interaction: 7 Complete [place]--The patient interacts appropriately with staff, other patients, and family members (e.g., controls temper, accepts criticism, is aware that words and actions have an impact on others), and does not require medication for   Problem Solvin Supervision--The patient requires  supervision (e.g., cueing or coaxing) to solve less routine problems only under stressful or unfamiliar conditions, but no more than 10% of the time.   Comprehension: 7 Complete [place]--The patient understand complex or abstract directions and conversation, and understand either spoken or written language (not necessarily English).   Expression: 6 Modified [place]--In most situations, the patient expresses complex or abstract ideas relatively clearly or with only edenilson difficulty.  The patient does not need any prompting, but (s)he may require an augmentative communication device or system.   Memory: 4 Minimal Prompting--The patient recognizes and remembers 75 to 90% of the time.     Assessment:  Nick Sellers is a 81 y.o. male with a diagnosis of cerebellar infarction.  He present with blurred vision, mild cognitive deficits, mild dysarthria/oral motor weakness, and no overt s/s of aspiration this date.      Do you have any cultural, spiritual, Sikhism conflicts, given your current situation?: none known    Discharge recommendations: Discharge Facility/Level Of Care Needs: outpatient speech therapy     Goals:    SLP Goals        Problem: SLP Goal    Goal Priority Disciplines Outcome   SLP Goal     SLP    Description:  Speech Language Pathology Goals  Goals expected to be met by 5/28/17  1.  Pt will tolerate regular diet and thin liquid with adequate oral management and no overt s/s of aspiration.   2.  Further assess reading/writing/visual-spatial skills and basic math/time skills s/p stroke.  3.  Pt will complete oral motor exercises x10 to improve lingual/labial strength and rom.   4.  Pt will recall 3/3 speech intelligibility strategies.   5.  Pt will complete short term memory tasks with 80% accuracy, to improve cognitive skills.                      Plan:   Patient to be seen Therapy Frequency: 5 x/week   Plan of Care expires: 06/19/17  Plan of Care reviewed with: patient  SLP Follow-up?: Yes          SLP G-Codes  Functional Assessment Tool Used: noms  Score: 6  Functional Limitations: Swallowing  Swallow Current Status (): CI  Swallow Goal Status (): CH    MIKE Ewing, CCC-SLP  Speech Language Pathologist  [phone]  5/21/2017

## 2017-05-21 NOTE — PLAN OF CARE
Problem: Patient Care Overview  Goal: Plan of Care Review  Recommendations     Recommendation/Intervention:   1. Change diet order to Diabetic 2200 calorie/Cardiac diet   2. Order Boost Glucose Control BID if po intake <50% meals.   3. RD to monitor     Goals: Pt will consume at least 75% meals   Nutrition Goal Status: new

## 2017-05-21 NOTE — PT/OT/SLP EVAL
"Physical Therapy  Evaluation    Nick Sellers   MRN: [id number]   Admitting Diagnosis: Cerebral infarction due to unspecified occlusion or stenosis of bilateral cerebellar arteries    PT Received On: 17  PT Start Time: 935     PT Stop Time: 948    PT Total Time (min): 13 min       Billable Minutes:  Evaluation 13    Diagnosis: Cerebral infarction due to unspecified occlusion or stenosis of bilateral cerebellar arteries    Past Medical History:   Diagnosis Date    CAD S/P percutaneous coronary angioplasty     Colon cancer     received radiation    Diabetes mellitus     High cholesterol 12/3/13    Hyperlipidemia     Hypertension     Prostate cancer       Past Surgical History:   Procedure Laterality Date    CORONARY ANGIOPLASTY WITH STENT PLACEMENT      ,      Referring physician: Aida  Date referred to PT: 2017    General Precautions: Standard, fall    Do you have any cultural, spiritual, Restorationist conflicts, given your current situation?: None noted    Patient History:  Lives With: spouse, grandchild(sherrie), child(sherrie), adult  Living Arrangements: house  Transportation Available: family or friend will provide  Living Environment Comment: Pt lives in a [facility] with [address]STE with his spouse, daughter and 7 y/o grandchild. Pt acts as caregiver for his spouse. Pt drives. He has been using a Rollator and SPC for ambulation; however, reports multiple falls when using both 2/2 instability.   Equipment Currently Used at Home: none  DME owned (not currently used): none    Previous Level of Function:  Ambulation Skills: needs device (until recently pt was (I) with ambulation)  Transfer Skills: independent  ADL Skills: independent  Work/Leisure Activity: independent    Subjective:  Communicated with RN (Dori) prior to session.    Chief Complaint: "I have been falling a lot."  Patient goals: "I would like to be able to go home and not fall."    Pain Ratin/10   Pain Rating Post-Intervention: " 0/10    Objective:   Patient found with: telemetry     Cognitive Exam:  Oriented to: Person, Place and Situation    Follows Commands/attention: Follows multistep  commands  Communication: clear/fluent  Safety awareness/insight to disability: intact    Physical Exam:  Postural examination/scapula alignment: Rounded shoulder, Head forward and Posterior pelvic tilt    Skin integrity: Visible skin intact, Thin and Dry  Edema: None noted    Sensation:   Intact ; upon evaluation and per pt report, no changes in sensation to B LE.    Lower Extremity Range of Motion:  Right Lower Extremity: WNL  Left Lower Extremity: WNL    Lower Extremity Strength: No focal weakness noted  Right Lower Extremity: WNL  Left Lower Extremity: WNL     Fine motor coordination:  Intact    Gross motor coordination: WFL    Functional Mobility:  Bed Mobility: Pt found and remained in bedside chair    Transfers:  Sit <> Stand Assistance: Supervision (from low bedside chair)  Sit <> Stand Assistive Device: No Assistive Device  Bed <> Chair Technique: Stand Pivot  Bed <> Chair Assistance: Supervision    Gait:   Gait Distance: x130 feet in hallway setting; pt demo antalgic gait pattern; however, with no overt LOB. Pt req intermittent CGA for directional guidance.   Assistance 1: Stand by Assistance, Contact Guard Assistance  Gait Assistive Device: No device, Blum rail (Hallrail intermittently)  Gait Pattern: reciprocal  Gait Deviation(s): decreased derrick, decreased weight-shifting ability, foot flat, hip hike, decreased step length    Balance:   Static Sit: GOOD-: Takes MODERATE challenges from all directions but inconsistently  Dynamic Sit: GOOD-: Maintains balance through MODERATE excursions of active trunk movement,     Static Stand: FAIR: Maintains without assist but unable to take challenges  Dynamic stand: FAIR+: Needs CLOSE SUPERVISION during gait and is able to right self with minor LOB    Therapeutic Activities and Exercises:  PT reviewed  stroke s/s, reasons for falling, strategies to decrease falling, recommendations for OP PT, need for 2 wheeled RW for D/C for increased safety and stability at this time. Pt verbalized understanding. Communicated with stroke team. Pt agreeable to remain UIC. RN aware of pt's status and mobility needs. Questions/concerns addressed within PT scope of practice.     AM-PAC 6 CLICK MOBILITY  How much help from another person does this patient currently need?   1 = Unable, Total/Dependent Assistance  2 = A lot, Maximum/Moderate Assistance  3 = A little, Minimum/Contact Guard/Supervision  4 = None, Modified [place]/Independent    Turning over in bed (including adjusting bedclothes, sheets and blankets)?: 4  Sitting down on and standing up from a chair with arms (e.g., wheelchair, bedside commode, etc.): 4  Moving from lying on back to sitting on the side of the bed?: 4  Moving to and from a bed to a chair (including a wheelchair)?: 4  Need to walk in hospital room?: 3  Climbing 3-5 steps with a railing?: 3  Total Score: 22     AM-PAC Raw Score CMS G-Code Modifier Level of Impairment Assistance   6 % Total / Unable   7 - 9 CM 80 - 100% Maximal Assist   10 - 14 CL 60 - 80% Moderate Assist   15 - 19 CK 40 - 60% Moderate Assist   20 - 22 CJ 20 - 40% Minimal Assist   23 CI 1-20% SBA / CGA   24 CH 0% Independent/ Mod I     Patient left up in chair with all lines intact, call button in reach and RN notified.    Assessment:   Nick Sellers is a 81 y.o. male with a medical diagnosis of Cerebral infarction due to unspecified occlusion or stenosis of bilateral cerebellar arteries. PTA pt was (I) with mobilty, ADLs, driving, not working. Pt acts as caregiver for his spouse and grandchild. Pt reports multiple falls in the home related to Rollator use. Pt req supervision for all mobility at this time with good safety awareness and ability to identify episodes of dizziness. Pt will benefit from continuation of OP PT  services to address high level balance. Patient will benefit from continued PT services to address:    Rehab identified problem list/impairments: Rehab identified problem list/impairments: gait instability, impaired balance    Rehab potential is good.    Activity tolerance: Good    Discharge Facility/Level Of Care Needs: outpatient PT     Barriers to discharge: None    Equipment Needed After Discharge: ROLLING WALKER (pt should have 2 wheeled RW for increased safety and stability in the home and community)     GOALS:    Physical Therapy Goals        Problem: Physical Therapy Goal    Goal Priority Disciplines Outcome Goal Variances Interventions   Physical Therapy Goal     PT/OT, PT Ongoing (interventions implemented as appropriate)     Description:  Goals to be met by: 2017     Patient will increase functional independence with mobility by performin. Supine to sit with [place]  2. Sit to supine with [place]  3. Sit to stand transfer with [place]  4. Bed to chair transfer with [place] and No AD  5. Gait x200 feet with Supervision using rolling walker  6. Stand for x10 minutes with Stand-by Assistance while performing dynamic balance tasks  7. Lower extremity exercise program x10 reps with supervision to improve coordination and strength                 PLAN:    Patient to be seen 6 x/week to address the above listed problems via gait training, therapeutic activities, therapeutic exercises, neuromuscular re-education  Plan of Care expires: 17  Plan of Care reviewed with: patient     Yarelis Estrada, PT, DPT  [phone]  2017

## 2017-05-21 NOTE — ASSESSMENT & PLAN NOTE
Nutrition Problem:   Other: Obesity    Etiology/Related to:   Excessive energy intake     Signs/Symptoms (as evidenced by):   BMI >36 and associated co morbidities such as T2DM and HLD + high triglycerides    Treatment Strategy:   Despite obesity status, order higher calorie diet order- Diabetic 2200. If PO intake <50% order Boost Glucose Control ONS BID    Nutrition Diagnosis Status:   New

## 2017-05-21 NOTE — PROGRESS NOTES
[facility]  Vascular Neurology  Comprehensive Stroke Center  Progress Note      Assessment/Plan:     * Cerebral infarction due to unspecified occlusion or stenosis of bilateral cerebellar arteries    80 y/o PMH HTN, DM2, HLD, remote right carona radiata infarct w/o residual deficits who presents with chief complaint of falls. Admitted to [place] with acute bilateral cerebellar infarcts. No tPA given 2/2 out of window and no thrombectomy recommended by Telemedicine at the time given no large vessel dz. Discharged home with possible worsening of balance issues though may be due to previous CVA. Not a candidate for tPA given recent CVA    Antithrombotics: ASA 325mg, plavix  Statins: Lipitor 40mg qd  PT/OT/SLP/Rehab  MRI Brain with punctate acute infarcts of posterior amy and punctate foci in anterior medulla, will compare to OSH MRI reporting cerebellar infarcts. 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 which was seen at OSH [facility]  2D ECHO pending  TSH 1.6, LDL 68, A1c pending        Hypertension    Stroke risk factor  -Restarted amlodipine 10mg  -Restarted coreg 25mg BID  -Holding home lisinopril 20mg qd  -Holding home spironolactone 25mg qd  -Holding home hydralazine 50mg TID  -Hydralazine IV PRN for SBP > 180        Diabetes mellitus, type 2    Stroke risk factor  -A1c pending  -Only on metformin 500mg po BID at home  -LDSSI        CAD S/P percutaneous coronary angioplasty    -Restart aspirin/plavix if no bleed or new CVA        HLD (hyperlipidemia)    Stroke risk factor  -Continue atorvastatin 40mg qd        Cerebral aneurysm, nonruptured    -3mm PCOM aneurysm from OSH imaging, also present on 2013 imaging (MRA)              Neurologic Chief Complaint: falls    Subjective:     Interval History: Patient is seen for follow-up neurological assessment and treatment recommendations: No acute events. Mr. Sellers reports he is feeling a little better. Waiting to work with therapy.      Medications: I have not reviewed the current medication administration record.    Prescriptions Prior to Admission   Medication Sig Dispense Refill Last Dose    amlodipine (NORVASC) 10 MG tablet Take 10 mg by mouth once daily.   5/19/2017    aspirin (ECOTRIN) 325 MG EC tablet Take 325 mg by mouth once daily.   5/20/2017    atorvastatin (LIPITOR) 40 MG tablet Take 1 tablet (40 mg total) by mouth every evening. 30 tablet 2 5/19/2017    carvedilol (COREG) 25 MG tablet Take 25 mg by mouth 2 (two) times daily with meals.   5/20/2017    clopidogrel (PLAVIX) 75 mg tablet Take 1 tablet (75 mg total) by mouth once daily. 30 tablet 2 5/19/2017    hydrALAZINE (APRESOLINE) 50 MG tablet Take 50 mg by mouth 3 (three) times daily.   5/20/2017    lisinopril (PRINIVIL,ZESTRIL) 40 MG tablet Take 80 mg by mouth once daily.   5/20/2017    metformin (GLUCOPHAGE) 500 MG tablet Take 1 tablet (500 mg total) by mouth 2 (two) times daily with meals. 60 tablet 2 5/20/2017    multivitamin capsule Take 1 capsule by mouth once daily.   5/19/2017    oxybutynin (DITROPAN) 5 MG Tab Take 5 mg by mouth 2 (two) times daily.   5/20/2017    spironolactone-hydrochlorothiazide 25-25mg (ALDACTAZIDE) 25-25 mg Tab Take 1 tablet by mouth once daily.   5/20/2017    albuterol 90 mcg/actuation inhaler Inhale 2 puffs into the lungs every 6 (six) hours as needed for Wheezing or Shortness of Breath. 8.5 g 2 Taking    chlorthalidone (HYGROTEN) 50 MG Tab Take 100 mg by mouth once daily.   Taking    donepezil (ARICEPT) 10 MG tablet Take 10 mg by mouth 2 (two) times daily.   Taking    hydrALAZINE (APRESOLINE) 50 MG tablet Take 50 mg by mouth 2 (two) times daily.   Taking    meclizine (ANTIVERT) 12.5 mg tablet Take 1 tablet (12.5 mg total) by mouth 3 (three) times daily as needed for Dizziness. 90 tablet 2 Taking    metoprolol tartrate (LOPRESSOR) 100 MG tablet Take 100 mg by mouth 2 (two) times daily.   Taking    omega-3 fatty acids-vitamin E  (FISH OIL) 1,000 mg Cap Take 1,000 mg by mouth 2 (two) times daily.   Taking       Review of Systems   Constitutional: Negative for chills and fever.   HENT: Negative for congestion and rhinorrhea.    Eyes: Negative for discharge and redness.   Respiratory: Negative for cough and shortness of breath.    Cardiovascular: Negative for chest pain and palpitations.   Gastrointestinal: Negative for nausea and vomiting.   Genitourinary: Negative for dysuria and hematuria.   Musculoskeletal: Negative for myalgias and neck pain.   Skin: Negative for rash and wound.   Neurological: Positive for dizziness, facial asymmetry and weakness. Negative for speech difficulty, light-headedness, numbness and headaches.   Psychiatric/Behavioral: Negative for agitation and confusion.     Objective:     Vital Signs (Most Recent):  Temp: 97.8 °F (36.6 °C) (05/21/17 1119)  Pulse: 60 (05/21/17 1119)  Resp: 18 (05/21/17 1119)  BP: (!) 152/72 (05/21/17 1119)  SpO2: 97 % (05/21/17 1119)    Vital Signs Range (Last 24H):  Temp:  [93.4 °F (34.1 °C)-98.2 °F (36.8 °C)]   Pulse:  [54-72]   Resp:  [16-18]   BP: (139-211)/(68-95)   SpO2:  [95 %-100 %]     Physical Exam   Constitutional: He is oriented to person, place, and time. He appears well-developed and well-nourished. No distress.   HENT:   Head: Normocephalic and atraumatic.   Eyes: EOM are normal. Pupils are equal, round, and reactive to light.   Neck: Normal range of motion. Neck supple.   Cardiovascular: Normal rate and regular rhythm.    Pulmonary/Chest: Effort normal. No respiratory distress.   Abdominal: Soft. He exhibits no distension.   Musculoskeletal: He exhibits no edema or tenderness.   Neurological: He is alert and oriented to person, place, and time. A cranial nerve deficit is present. Coordination normal. GCS eye subscore is 4 - spontaneous. GCS verbal subscore is 5 - oriented. GCS motor subscore is 6 - obeys commands.   Skin: Skin is warm and dry. He is not diaphoretic.        Neurological Exam:   LOC: alert and follows requests  Language: No aphasia  Speech: No dysarthria  Orientation: Person, Place, Time  Memory: Recent memory intact, Remote memory intact, Age correct, Month correct  Visual Fields (CN II): Full  EOM (CN III, IV, VI): Full/intact  Pupils (CN III, IV, VI): PERRL  Facial Sensation (CN V): Symmetric   Facial Movement (CN VII): upper weakness left upper and left lower and lower weakness left upper and left lower  Tongue (CN XII): to midline  Motor*: Arm Left:  Normal (5/5), Leg Left:   Normal (5/5), Arm Right:   Normal (5/5), Leg Right:   Normal (5/5)  Cerebellar*: Normal limb  Sensation: intact to light touch, pain  Tone: Arm-Left: normal; Leg-Left: normal; Arm-Right: normal; Leg-Right: normal    Stroke Team Times:   Last Known Normal Date and Time : 2017  Symptom Onset Date and Time:2017    NIH Stroke Scale:  Interval: baseline (upon arrival/admit)  Level of Consciousness: 0 - alert  LOC Questions: 0 - answers both correctly  LOC Commands: 0 - performs both correctly  Best Gaze: 0 - normal  Visual: 0 - no visual loss  Facial Palsy: 2 - partial  Motor Left Arm: 0 - no drift  Motor Right Arm: 0 - no drift  Motor Left Le - no drift  Motor Right Le - no drift  Limb Ataxia: 0 - absent  Sensory: 0 - normal  Best Language: 0 - no aphasia  Dysarthria: 0 - normal articulation  Extinction and Inattention: 0 - no neglect  NIH Stroke Scale Total: 2  Catherine Coma Scale:  Best Eye Response: 4 - spontaneous  Best Motor Response: 6 - obeys commands  Best Verbal Response: 5 - oriented  Catherine Coma Scale Total: 15  Modified Tacho Scale:   Timeline: Prior to symptoms onset  Modified Tacho Score: 2 - slight disability        Laboratory:  CMP:     Recent Labs  Lab 17   CALCIUM 10.0   ALBUMIN 3.9   PROT 7.1      K 4.5   CO2 25      BUN 18   CREATININE 1.0   ALKPHOS 62   ALT 30   AST 17   BILITOT 0.5     CBC:     Recent Labs  Lab 17  1553    WBC 7.54   RBC 5.21   HGB 14.9   HCT 44.8      MCV 86   MCH 28.6   MCHC 33.3       Diagnostic Results:  Brain Imaging: MRI Head. Punctate acute infarcts within the posterior amy with possible additional punctate focus within the anterior medulla.  No intracranial hemorrhage. Suspected high-grade stenosis versus occlusion of the proximal aspect of the V4 segment of the distal right vertebral artery.        Moshe Arthur IV, MD  [facility] St[facility]  Department of Vascular Neurology   [facility]

## 2017-05-21 NOTE — CONSULTS
[facility]  Adult Nutrition  Consult Note    SUMMARY     Recommendations    Recommendation/Intervention:   1. Change diet order to Diabetic 2200 calorie/Cardiac diet   2. Order Boost Glucose Control BID if po intake <50% meals.   3. RD to monitor    Goals: Pt will consume at least 75% meals   Nutrition Goal Status: new  Communication of RD Recs: reviewed with RN      Reason for Assessment    Reason for Assessment: physician consult  Diagnosis: other (see comments) (cerebellar infarction)  Relevent Medical History: CAD, colon ca,  DM, high cholesterol, HLD, HTN, prostate ca   Interdisciplinary Rounds: did not attend     General Information Comments: Pt doing well today, pt passed [facility]. Denies N/V/D/C. Diet advanced to regular.     Nutrition Discharge Planning: Adequate po intake od ADA diet. If po intake <50% - add at least 1 ONS daily    Nutrition Prescription Ordered    Current Diet Order: ADA 1800 calorie diet     Evaluation of Received Nutrients/Fluid Intake          Nutrition Risk Screen     Nutrition Risk Screen: no indicators present    Nutrition/Diet History    Patient Reported Diet/Restrictions/Preferences: diabetic diet, general     Food Preferences: no cultural or Church needs identified     Factors Affecting Nutritional Intake:  (-)     Labs/Tests/Procedures/Meds       Pertinent Labs Reviewed: reviewed  Pertinent Labs Comments: Trig 201, HDL 35, A1c 7.3%  Pertinent Medications Reviewed: reviewed    Physical Findings    Overall Physical Appearance: obese        Skin: intact    Anthropometrics     Height (inches): 70.98 in  Weight Method: Stated  Weight (kg): 117.9 kg  Ideal Body Weight (IBW), Male: 171.88 lb     % Ideal Body Weight, Male (lb): 151.23 lb     BMI (kg/m2): 36.27  BMI Grade: 35 - 39.9 - obesity - grade II     Estimated/Assessed Needs    Weight Used For Calorie Calculations: 117.9 kg (259 lb 14.8 oz)   Height (cm): 180.3 cm     Energy Need Method: [place] Banner (9171  kcal (x1.2))   RMR ([place]-St. Jeor Equation): 1921.02        Weight Used For Protein Calculations: 78.1 kg (172 lb 2.9 oz) (IBW)  Protein Requirements: 78-94 g (1.0-1.2 g/kg)    Fluid Need Method: RDA Method      CHO Requirement: 50% of total kcals     Assessment and Plan    Non morbid obesity    Nutrition Problem:   Other: Obesity    Etiology/Related to:   Excessive energy intake     Signs/Symptoms (as evidenced by):   BMI >36 and associated co morbidities such as T2DM and HLD + high triglycerides    Treatment Strategy:   Despite obesity status, order higher calorie diet order- Diabetic 2200. If PO intake <50% order Boost Glucose Control ONS BID    Nutrition Diagnosis Status:   New              Monitor and Evaluation    Food and Nutrient Intake: food and beverage intake, energy intake  Food and Nutrient Adminstration: diet order     Physical Activity and Function: nutrition-related ADLs and IADLs  Anthropometric Measurements: weight, weight change, body mass index  Biochemical Data, Medical Tests and Procedures: electrolyte and renal panel, glucose/endocrine profile, lipid profile  Nutrition-Focused Physical Findings: overall appearance      Nutrition Risk    Level of Risk: other (see comments) (1x/week)    Nutrition Follow-Up    RD Follow-up?: Yes

## 2017-05-21 NOTE — ASSESSMENT & PLAN NOTE
80 y/o PMH HTN, DM2, HLD, remote right carona radiata infarct w/o residual deficits who presents with chief complaint of falls. Admitted to [place] with acute bilateral cerebellar infarcts. No tPA given 2/2 out of window and no thrombectomy recommended by Telemedicine at the time given no large vessel dz. Discharged home with possible worsening of balance issues though may be due to previous CVA. Not a candidate for tPA given recent CVA    Antithrombotics: ASA 325mg, plavix  Statins: Lipitor 40mg qd  PT/OT/SLP/Rehab  MRI Brain with punctate acute infarcts of posterior amy and punctate foci in anterior medulla, will compare to OSH MRI reporting cerebellar infarcts. 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

## 2017-05-21 NOTE — SUBJECTIVE & OBJECTIVE
Neurologic Chief Complaint: falls    Subjective:     Interval History: Patient is seen for follow-up neurological assessment and treatment recommendations: No acute events. Mr. Sellers reports he is feeling a little better. Waiting to work with therapy.     Medications: I have not reviewed the current medication administration record.    Prescriptions Prior to Admission   Medication Sig Dispense Refill Last Dose    amlodipine (NORVASC) 10 MG tablet Take 10 mg by mouth once daily.   5/19/2017    aspirin (ECOTRIN) 325 MG EC tablet Take 325 mg by mouth once daily.   5/20/2017    atorvastatin (LIPITOR) 40 MG tablet Take 1 tablet (40 mg total) by mouth every evening. 30 tablet 2 5/19/2017    carvedilol (COREG) 25 MG tablet Take 25 mg by mouth 2 (two) times daily with meals.   5/20/2017    clopidogrel (PLAVIX) 75 mg tablet Take 1 tablet (75 mg total) by mouth once daily. 30 tablet 2 5/19/2017    hydrALAZINE (APRESOLINE) 50 MG tablet Take 50 mg by mouth 3 (three) times daily.   5/20/2017    lisinopril (PRINIVIL,ZESTRIL) 40 MG tablet Take 80 mg by mouth once daily.   5/20/2017    metformin (GLUCOPHAGE) 500 MG tablet Take 1 tablet (500 mg total) by mouth 2 (two) times daily with meals. 60 tablet 2 5/20/2017    multivitamin capsule Take 1 capsule by mouth once daily.   5/19/2017    oxybutynin (DITROPAN) 5 MG Tab Take 5 mg by mouth 2 (two) times daily.   5/20/2017    spironolactone-hydrochlorothiazide 25-25mg (ALDACTAZIDE) 25-25 mg Tab Take 1 tablet by mouth once daily.   5/20/2017    albuterol 90 mcg/actuation inhaler Inhale 2 puffs into the lungs every 6 (six) hours as needed for Wheezing or Shortness of Breath. 8.5 g 2 Taking    chlorthalidone (HYGROTEN) 50 MG Tab Take 100 mg by mouth once daily.   Taking    donepezil (ARICEPT) 10 MG tablet Take 10 mg by mouth 2 (two) times daily.   Taking    hydrALAZINE (APRESOLINE) 50 MG tablet Take 50 mg by mouth 2 (two) times daily.   Taking    meclizine (ANTIVERT)  12.5 mg tablet Take 1 tablet (12.5 mg total) by mouth 3 (three) times daily as needed for Dizziness. 90 tablet 2 Taking    metoprolol tartrate (LOPRESSOR) 100 MG tablet Take 100 mg by mouth 2 (two) times daily.   Taking    omega-3 fatty acids-vitamin E (FISH OIL) 1,000 mg Cap Take 1,000 mg by mouth 2 (two) times daily.   Taking       Review of Systems   Constitutional: Negative for chills and fever.   HENT: Negative for congestion and rhinorrhea.    Eyes: Negative for discharge and redness.   Respiratory: Negative for cough and shortness of breath.    Cardiovascular: Negative for chest pain and palpitations.   Gastrointestinal: Negative for nausea and vomiting.   Genitourinary: Negative for dysuria and hematuria.   Musculoskeletal: Negative for myalgias and neck pain.   Skin: Negative for rash and wound.   Neurological: Positive for dizziness, facial asymmetry and weakness. Negative for speech difficulty, light-headedness, numbness and headaches.   Psychiatric/Behavioral: Negative for agitation and confusion.     Objective:     Vital Signs (Most Recent):  Temp: 97.8 °F (36.6 °C) (05/21/17 1119)  Pulse: 60 (05/21/17 1119)  Resp: 18 (05/21/17 1119)  BP: (!) 152/72 (05/21/17 1119)  SpO2: 97 % (05/21/17 1119)    Vital Signs Range (Last 24H):  Temp:  [93.4 °F (34.1 °C)-98.2 °F (36.8 °C)]   Pulse:  [54-72]   Resp:  [16-18]   BP: (139-211)/(68-95)   SpO2:  [95 %-100 %]     Physical Exam   Constitutional: He is oriented to person, place, and time. He appears well-developed and well-nourished. No distress.   HENT:   Head: Normocephalic and atraumatic.   Eyes: EOM are normal. Pupils are equal, round, and reactive to light.   Neck: Normal range of motion. Neck supple.   Cardiovascular: Normal rate and regular rhythm.    Pulmonary/Chest: Effort normal. No respiratory distress.   Abdominal: Soft. He exhibits no distension.   Musculoskeletal: He exhibits no edema or tenderness.   Neurological: He is alert and oriented to  person, place, and time. A cranial nerve deficit is present. Coordination normal. GCS eye subscore is 4 - spontaneous. GCS verbal subscore is 5 - oriented. GCS motor subscore is 6 - obeys commands.   Skin: Skin is warm and dry. He is not diaphoretic.       Neurological Exam:   LOC: alert and follows requests  Language: No aphasia  Speech: No dysarthria  Orientation: Person, Place, Time  Memory: Recent memory intact, Remote memory intact, Age correct, Month correct  Visual Fields (CN II): Full  EOM (CN III, IV, VI): Full/intact  Pupils (CN III, IV, VI): PERRL  Facial Sensation (CN V): Symmetric   Facial Movement (CN VII): upper weakness left upper and left lower and lower weakness left upper and left lower  Tongue (CN XII): to midline  Motor*: Arm Left:  Normal (5/5), Leg Left:   Normal (5/5), Arm Right:   Normal (5/5), Leg Right:   Normal (5/5)  Cerebellar*: Normal limb  Sensation: intact to light touch, pain  Tone: Arm-Left: normal; Leg-Left: normal; Arm-Right: normal; Leg-Right: normal    Stroke Team Times:   Last Known Normal Date and Time : 2017  Symptom Onset Date and Time:2017    NIH Stroke Scale:  Interval: baseline (upon arrival/admit)  Level of Consciousness: 0 - alert  LOC Questions: 0 - answers both correctly  LOC Commands: 0 - performs both correctly  Best Gaze: 0 - normal  Visual: 0 - no visual loss  Facial Palsy: 2 - partial  Motor Left Arm: 0 - no drift  Motor Right Arm: 0 - no drift  Motor Left Le - no drift  Motor Right Le - no drift  Limb Ataxia: 0 - absent  Sensory: 0 - normal  Best Language: 0 - no aphasia  Dysarthria: 0 - normal articulation  Extinction and Inattention: 0 - no neglect  NIH Stroke Scale Total: 2  Catherine Coma Scale:  Best Eye Response: 4 - spontaneous  Best Motor Response: 6 - obeys commands  Best Verbal Response: 5 - oriented  Catherine Coma Scale Total: 15  Modified [place] Scale:   Timeline: Prior to symptoms onset  Modified Tacho Score: 2 - slight  disability        Laboratory:  CMP:     Recent Labs  Lab 05/21/17  0448   CALCIUM 10.0   ALBUMIN 3.9   PROT 7.1      K 4.5   CO2 25      BUN 18   CREATININE 1.0   ALKPHOS 62   ALT 30   AST 17   BILITOT 0.5     CBC:     Recent Labs  Lab 05/21/17  0448   WBC 7.54   RBC 5.21   HGB 14.9   HCT 44.8      MCV 86   MCH 28.6   MCHC 33.3       Diagnostic Results:  Brain Imaging: MRI Head. Punctate acute infarcts within the posterior amy with possible additional punctate focus within the anterior medulla.  No intracranial hemorrhage. Suspected high-grade stenosis versus occlusion of the proximal aspect of the V4 segment of the distal right vertebral artery.

## 2017-05-21 NOTE — PT/OT/SLP EVAL
Occupational Therapy  Evaluation    Nick Sellers   MRN: [id number]   Admitting Diagnosis: Cerebral infarction due to unspecified occlusion or stenosis of bilateral cerebellar arteries    OT Date of Treatment: 05/21/17   OT Start Time: 0816  OT Stop Time: 0847  OT Total Time (min): 31 min    Billable Minutes:  Evaluation 20  Therapeutic Activity 11    Diagnosis: Cerebral infarction due to unspecified occlusion or stenosis of bilateral cerebellar arteries       Past Medical History:   Diagnosis Date    CAD S/P percutaneous coronary angioplasty     Colon cancer     received radiation    Diabetes mellitus     High cholesterol 12/3/13    Hyperlipidemia     Hypertension     Prostate cancer       Past Surgical History:   Procedure Laterality Date    CORONARY ANGIOPLASTY WITH STENT PLACEMENT      1998, 2001       Referring physician: MD Jerson  Date referred to OT: 5/20/17    General Precautions: Standard, aspiration, fall (cardiac)    Do you have any cultural, spiritual, Mandaen conflicts, given your current situation?: Zoroastrianism     Patient History:  Living Environment  Living Environment Comment: Pt resides with his spouse, grand-daughters, & great grandchild in [place] in a 1 story house iwth no steps.  Pt has a TTB, hand held shower hose, rollator, SC, & w/c at home.  Pt reports multiple falls at home over the last month with increased amount over the last week.    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  Pt assists his wife who has dementia at home however does not have to provide any lifting assistance.     Dominant hand:  right    Subjective:  Communicated with RN prior to session.  Pt reported that when he starts to fall he cannot stop it & just falls.  Chief Complaint: decreased balance in standing  Patient/Family stated goals: to get better & not fall    Pain Ratin/10  Pain Rating Post-Intervention: 0/10    Objective:  Patient found with: telemetry    Cognitive Exam:  Oriented to: Person, Place, Time and Situation  Follows Commands/attention: Follows two-step commands  Communication: clear/fluent  Memory:  No Deficits noted  Safety awareness/insight to disability: intact  Coping skills/emotional control: Appropriate to situation    Visual/perceptual:  Intact    Physical Exam:  Postural examination/scapula alignment: Rounded shoulder, Head forward, Posterior pelvic tilt and Lateral weight shift of hips  Skin integrity: Visible skin intact  Edema: None noted     Sensation:   Intact  light/touch BUE    Upper Extremity Range of Motion:  Right Upper Extremity: WFL except 100* shoulder flexion  Left Upper Extremity: WFL except 100* shoulder flexion    Upper Extremity Strength:  Right Upper Extremity: WFL except 3-/5 shoulder flexion  Left Upper Extremity: WFL except 3-/5 shoulder flexion   Strength: WFL    Fine motor coordination:   Intact  Left hand thumb/finger opposition skills and Right hand thumb/finger opposition skills    Gross motor coordination: WFL    Functional Mobility:  Bed Mobility:  Supine to Sit: Stand by Assistance    Transfers:  Sit <> Stand Assistance: Contact Guard Assistance  Sit <> Stand Assistive Device: No Assistive Device  Bed <> Chair Technique: Stand Pivot  Bed <> Chair Transfer Assistance: Stand By Assistance (from EOB to chair)  Bed <> Chair Assistive Device: No Assistive Device    Functional Ambulation: Pt required CGA with functional mobility in room during ADL's.    Activities of Daily Living:     UE Dressing Level of Assistance: Stand by assistance (doffing & donning pull over shirt while seated  "EOB)  LE Dressing Level of Assistance: Minimum assistance (with (A) for postural control while seated EOB donning socks due to rightward leaning)  Grooming Position: Standing at sink  Grooming Level of Assistance: Contact guard assistance     Therapeutic Activities and Exercises:  Pt required SBA-CGA with balance in standing during ADL's.  Provided education regarding stroke signs, what to do if suspect stroke, postural control deficits, safety, & need for (A) during mobility in room with pt verbalizing understanding.  Pt had no further questions & when asked whether there were any concerns pt reported none.      AM-PAC 6 CLICK ADL  How much help from another person does this patient currently need?  1 = Unable, Total/Dependent Assistance  2 = A lot, Maximum/Moderate Assistance  3 = A little, Minimum/Contact Guard/Supervision  4 = None, Modified [place]/Independent    Putting on and taking off regular lower body clothing? : 3  Bathing (including washing, rinsing, drying)?: 3  Toileting, which includes using toilet, bedpan, or urinal? : 3  Putting on and taking off regular upper body clothing?: 3  Taking care of personal grooming such as brushing teeth?: 3  Eating meals?: 3  Total Score: 18    AM-PAC Raw Score CMS "G-Code Modifier Level of Impairment Assistance   6 % Total / Unable   7 - 9 CM 80 - 100% Maximal Assist   10 - 14 CL 60 - 80% Moderate Assist   15 - 19 CK 40 - 60% Moderate Assist   20 - 22 CJ 20 - 40% Minimal Assist   23 CI 1-20% SBA / CGA   24 CH 0% Independent/ Mod I       Patient left up in chair with all lines intact, call button in reach, RN & PCT notified and white board updated.    Assessment:  Nick Sellers is a 81 y.o. male with a medical diagnosis of Cerebral infarction due to unspecified occlusion or stenosis of bilateral cerebellar arteries and presents with deficits in postural control in standing affecting safety & fall risk during all ADL's, IADL's, roles & responsibilities.  " Pt is caretaker of his spouse however is unable to perform this role currently due to his own high fall risk.  Pt would benefit from OT to address independence with ADL's.    Rehab identified problem list/impairments: Rehab identified problem list/impairments: weakness, impaired endurance, impaired self care skills, impaired balance, impaired functional mobilty    Rehab potential is good.    Activity tolerance: Good    Discharge recommendations: Discharge Facility/Level Of Care Needs: outpatient OT     GOALS:    Occupational Therapy Goals        Problem: Occupational Therapy Goal    Goal Priority Disciplines Outcome Interventions   Occupational Therapy Goal     OT, PT/OT Ongoing (interventions implemented as appropriate)    Description:  Goals to be met by: 5/28/17     Patient will increase functional independence with ADLs by performing:    UE Dressing with Modified [place].  LE Dressing with Modified [place].  Grooming while standing with Modified [place].  Toileting from toilet with Modified [place] for hygiene and clothing management.   Rolling to Bilateral with Modified [place].   Supine to sit with Modified [place].  Stand pivot transfers with Modified [place].                      PLAN:  Patient to be seen 6 x/week to address the above listed problems via self-care/home management, cognitive retraining, sensory integration, therapeutic activities, therapeutic exercises, neuromuscular re-education  Plan of Care expires: 06/20/17  Plan of Care reviewed with: patient         Tiana JORDAN Stoll  05/21/2017

## 2017-05-21 NOTE — ASSESSMENT & PLAN NOTE
Stroke risk factor  -Restarted amlodipine 10mg  -Restarted coreg 25mg BID  -Holding home lisinopril 20mg qd  -Holding home spironolactone 25mg qd  -Holding home hydralazine 50mg TID  -Hydralazine IV PRN for SBP > 180

## 2017-05-21 NOTE — PLAN OF CARE
Problem: Physical Therapy Goal  Goal: Physical Therapy Goal  Goals to be met by: 2017     Patient will increase functional independence with mobility by performin. Supine to sit with [place]  2. Sit to supine with [place]  3. Sit to stand transfer with [place]  4. Bed to chair transfer with [place] and No AD  5. Gait x200 feet with Supervision using rolling walker  6. Stand for x10 minutes with Stand-by Assistance while performing dynamic balance tasks  7. Lower extremity exercise program x10 reps with supervision to improve coordination and strength    Outcome: Ongoing (interventions implemented as appropriate)    PT Evaluation complete. Recommending OP PT upon D/C.    Yarelis Estrada, PT, DPT  [phone]  10/5/2016

## 2017-05-21 NOTE — PLAN OF CARE
Problem: Occupational Therapy Goal  Goal: Occupational Therapy Goal  Goals to be met by: 5/28/17     Patient will increase functional independence with ADLs by performing:    UE Dressing with Modified [place].  LE Dressing with Modified [place].  Grooming while standing with Modified [place].  Toileting from toilet with Modified [place] for hygiene and clothing management.   Rolling to Bilateral with Modified [place].   Supine to sit with Modified [place].  Stand pivot transfers with Modified [place].    Outcome: Ongoing (interventions implemented as appropriate)  OT eval completed.

## 2017-05-21 NOTE — PLAN OF CARE
Problem: Patient Care Overview  Goal: Plan of Care Review  Outcome: Ongoing (interventions implemented as appropriate)  POC reviewed will pt. Sr up x 2, instructed to call for assistance. Pt sitting up in chair with family at bedside. Pt will remain free of falls/injury during hospital stay.

## 2017-05-21 NOTE — HPI
82 y/o PMH HTN, DM2, HLD, remote right carona radiata infarct w/o residual deficits who presents with chief complaint of falls. He reports acute onset blurry vision, imbalance on Tuesday 05/16 for with some left sided facial droop for which he presented to [facility]. There he was found to have intact strength and sensation but left sided facial drooping. MRI findings notable for extensive small vessel WM dz, multiple old deep WM lacunar infarcts LUCRECIA but no acute ischemic changes in the cortex. No bleeding. Did note several small cortical infarcts. An MRA brain suggested distal vertebral artery high-grade stenosis. He was seen by PT/OT who recommended outpatient PT and provided a walker. His family reports since he got home he has had worsening of his balance problems but no other neurologic findings. Has had some falls as well. No new vision changes, focal weakness, slurring speech, or worsening of his facial droop

## 2017-05-22 PROBLEM — I63.219: Status: ACTIVE | Noted: 2017-05-20

## 2017-05-22 PROBLEM — I63.22: Status: ACTIVE | Noted: 2017-05-20

## 2017-05-22 LAB
ALBUMIN SERPL BCP-MCNC: 3.8 G/DL
ALP SERPL-CCNC: 67 U/L
ALT SERPL W/O P-5'-P-CCNC: 30 U/L
ANION GAP SERPL CALC-SCNC: 10 MMOL/L
AST SERPL-CCNC: 18 U/L
BASOPHILS # BLD AUTO: 0.03 K/UL
BASOPHILS NFR BLD: 0.4 %
BILIRUB SERPL-MCNC: 0.4 MG/DL
BUN SERPL-MCNC: 17 MG/DL
CALCIUM SERPL-MCNC: 9.6 MG/DL
CHLORIDE SERPL-SCNC: 106 MMOL/L
CO2 SERPL-SCNC: 22 MMOL/L
CREAT SERPL-MCNC: 1 MG/DL
DIFFERENTIAL METHOD: NORMAL
EOSINOPHIL # BLD AUTO: 0.4 K/UL
EOSINOPHIL NFR BLD: 6 %
ERYTHROCYTE [DISTWIDTH] IN BLOOD BY AUTOMATED COUNT: 14.1 %
EST. GFR  (AFRICAN AMERICAN): >60 ML/MIN/1.73 M^2
EST. GFR  (NON AFRICAN AMERICAN): >60 ML/MIN/1.73 M^2
GLUCOSE SERPL-MCNC: 118 MG/DL
HCT VFR BLD AUTO: 43.9 %
HGB BLD-MCNC: 15.1 G/DL
LYMPHOCYTES # BLD AUTO: 2.2 K/UL
LYMPHOCYTES NFR BLD: 32.3 %
MCH RBC QN AUTO: 28.9 PG
MCHC RBC AUTO-ENTMCNC: 34.4 %
MCV RBC AUTO: 84 FL
MONOCYTES # BLD AUTO: 0.5 K/UL
MONOCYTES NFR BLD: 7.9 %
NEUTROPHILS # BLD AUTO: 3.7 K/UL
NEUTROPHILS NFR BLD: 53.1 %
PLATELET # BLD AUTO: 204 K/UL
PMV BLD AUTO: 10.9 FL
POCT GLUCOSE: 134 MG/DL (ref 70–110)
POCT GLUCOSE: 181 MG/DL (ref 70–110)
POCT GLUCOSE: 215 MG/DL (ref 70–110)
POCT GLUCOSE: 88 MG/DL (ref 70–110)
POTASSIUM SERPL-SCNC: 3.9 MMOL/L
PROT SERPL-MCNC: 7.1 G/DL
RBC # BLD AUTO: 5.22 M/UL
SODIUM SERPL-SCNC: 138 MMOL/L
WBC # BLD AUTO: 6.87 K/UL

## 2017-05-22 PROCEDURE — 99222 1ST HOSP IP/OBS MODERATE 55: CPT | Mod: ,,, | Performed by: NURSE PRACTITIONER

## 2017-05-22 PROCEDURE — 20600001 HC STEP DOWN PRIVATE ROOM

## 2017-05-22 PROCEDURE — 97530 THERAPEUTIC ACTIVITIES: CPT

## 2017-05-22 PROCEDURE — 97535 SELF CARE MNGMENT TRAINING: CPT

## 2017-05-22 PROCEDURE — 85025 COMPLETE CBC W/AUTO DIFF WBC: CPT

## 2017-05-22 PROCEDURE — 92507 TX SP LANG VOICE COMM INDIV: CPT

## 2017-05-22 PROCEDURE — 97116 GAIT TRAINING THERAPY: CPT

## 2017-05-22 PROCEDURE — 25000003 PHARM REV CODE 250: Performed by: HOSPITALIST

## 2017-05-22 PROCEDURE — 25000003 PHARM REV CODE 250: Performed by: NURSE PRACTITIONER

## 2017-05-22 PROCEDURE — 63600175 PHARM REV CODE 636 W HCPCS: Performed by: HOSPITALIST

## 2017-05-22 PROCEDURE — 36415 COLL VENOUS BLD VENIPUNCTURE: CPT

## 2017-05-22 PROCEDURE — 99233 SBSQ HOSP IP/OBS HIGH 50: CPT | Mod: ,,, | Performed by: PSYCHIATRY & NEUROLOGY

## 2017-05-22 PROCEDURE — 80053 COMPREHEN METABOLIC PANEL: CPT

## 2017-05-22 RX ORDER — LISINOPRIL 20 MG/1
40 TABLET ORAL DAILY
Status: DISCONTINUED | OUTPATIENT
Start: 2017-05-22 | End: 2017-05-23

## 2017-05-22 RX ORDER — LISINOPRIL 20 MG/1
80 TABLET ORAL DAILY
Status: DISCONTINUED | OUTPATIENT
Start: 2017-05-22 | End: 2017-05-22

## 2017-05-22 RX ORDER — HEPARIN SODIUM 5000 [USP'U]/ML
5000 INJECTION, SOLUTION INTRAVENOUS; SUBCUTANEOUS EVERY 8 HOURS
Status: DISCONTINUED | OUTPATIENT
Start: 2017-05-22 | End: 2017-05-23 | Stop reason: HOSPADM

## 2017-05-22 RX ORDER — CHLORTHALIDONE 50 MG/1
100 TABLET ORAL DAILY
Status: DISCONTINUED | OUTPATIENT
Start: 2017-05-22 | End: 2017-05-23 | Stop reason: HOSPADM

## 2017-05-22 RX ADMIN — SODIUM CHLORIDE, PRESERVATIVE FREE 3 ML: 5 INJECTION INTRAVENOUS at 10:05

## 2017-05-22 RX ADMIN — ATORVASTATIN CALCIUM 40 MG: 20 TABLET, FILM COATED ORAL at 10:05

## 2017-05-22 RX ADMIN — ACETAMINOPHEN 650 MG: 325 TABLET ORAL at 04:05

## 2017-05-22 RX ADMIN — CARVEDILOL 25 MG: 25 TABLET, FILM COATED ORAL at 06:05

## 2017-05-22 RX ADMIN — HYDRALAZINE HYDROCHLORIDE 10 MG: 20 INJECTION INTRAMUSCULAR; INTRAVENOUS at 03:05

## 2017-05-22 RX ADMIN — HYDRALAZINE HYDROCHLORIDE 10 MG: 20 INJECTION INTRAMUSCULAR; INTRAVENOUS at 04:05

## 2017-05-22 RX ADMIN — HEPARIN SODIUM 5000 UNITS: 5000 INJECTION, SOLUTION INTRAVENOUS; SUBCUTANEOUS at 10:05

## 2017-05-22 RX ADMIN — LISINOPRIL 40 MG: 20 TABLET ORAL at 08:05

## 2017-05-22 RX ADMIN — ASPIRIN 325 MG: 325 TABLET, DELAYED RELEASE ORAL at 08:05

## 2017-05-22 RX ADMIN — INSULIN ASPART 2 UNITS: 100 INJECTION, SOLUTION INTRAVENOUS; SUBCUTANEOUS at 09:05

## 2017-05-22 RX ADMIN — CLOPIDOGREL 75 MG: 75 TABLET, FILM COATED ORAL at 08:05

## 2017-05-22 RX ADMIN — CARVEDILOL 25 MG: 25 TABLET, FILM COATED ORAL at 05:05

## 2017-05-22 RX ADMIN — AMLODIPINE BESYLATE 10 MG: 10 TABLET ORAL at 08:05

## 2017-05-22 RX ADMIN — SODIUM CHLORIDE, PRESERVATIVE FREE 3 ML: 5 INJECTION INTRAVENOUS at 03:05

## 2017-05-22 RX ADMIN — HEPARIN SODIUM 5000 UNITS: 5000 INJECTION, SOLUTION INTRAVENOUS; SUBCUTANEOUS at 03:05

## 2017-05-22 RX ADMIN — CHLORTHALIDONE 100 MG: 50 TABLET ORAL at 03:05

## 2017-05-22 RX ADMIN — SODIUM CHLORIDE, PRESERVATIVE FREE 3 ML: 5 INJECTION INTRAVENOUS at 06:05

## 2017-05-22 NOTE — CONSULTS
PM&R consult received.    Reason for consult:  assess rehabilitation needs    Reviewed patient history and current admission.  Full consult to follow.    STEWART Montanez, FNP-C  Physical Medicine & Rehabilitation   05/22/2017  Spectralink: [phone]

## 2017-05-22 NOTE — DISCHARGE INSTRUCTIONS
[facility] will be contacting you or your daughter Italia to schedule a meeting for other services if you qualify.      You can also call [facility] at [phone] to discuss their adult  programs for you and maybe your wife.

## 2017-05-22 NOTE — ASSESSMENT & PLAN NOTE
82 y/o PMH HTN, DM2, HLD, remote right carona radiata infarct w/o residual deficits who presents with chief complaint of falls. Admitted to [facility] with acute bilateral cerebellar infarcts. No tPA given 2/2 out of window and no thrombectomy recommended by Telemedicine at the time given no large vessel dz. Discharged home with possible worsening of balance issues though may be due to previous CVA. Not a candidate for tPA given recent CVA    Antithrombotics: ASA 325mg, plavix  Statins: Lipitor 40mg qd  PT/OT rec HH which as same as [place]  MRI Brain with punctate acute infarcts of posterior amy and punctate foci in anterior medulla, will compare to OSH MRI reporting cerebellar infarcts. 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

## 2017-05-22 NOTE — PLAN OF CARE
LUPE spoke with pt's daughter Italia regarding d/c plans for pt.  Made aware pt does not require inpatient therapy at time of d/c.   Pt lives in home with wife who does ambulate but does require some assistance but mostly supervision.  Pt helps with meals.  Pt receives assistance in the home through [place] [facility] in [facility].  Has an attendant once a week.  Pt has all medical care at [facility] in [place].  Pt also lives in home with granddaughter (Italia's daughter) 32 y/o who also requires some supervision but ambulates independently with use of a walker.  Italia states she is in the home when she is not working her usual shifts at work.  State she and her sister are well aware that pt and wife will likely require more support at home and are currently working to find assistance in the community.  STACIE has spoken with [facility] and representative will be contacting family for further assistance.      LUPE has also urged Italia to discuss with her mother's PCP possibility of home health with  to assist with any needs her mother may have.  Family is not interested in nursing home placement or adult day care at this time but will seek guidance from the [facility].      [facility] [place] [facility] contacted and will have representative contact daughter Italia for appointment for another assessment.  Agency does have additional programs offered and will assess pt's eligibility.      Information for [facility] Adult  given to Italia and placed in AVS.    Flavia Ovalle, RN  Case Management  [phone]

## 2017-05-22 NOTE — ASSESSMENT & PLAN NOTE
Stroke risk factor  -Restarted amlodipine 10mg  -Restarted coreg 25mg BID  -Restarted lisinopril 40mg qd  -Started chlorthalidone 100mg qd  -Holding home spironolactone 25mg qd  -Holding home hydralazine 50mg TID  -Hydralazine IV PRN for SBP > 180

## 2017-05-22 NOTE — PLAN OF CARE
"Sw spoke with pt regarding d/c plan. When this sw asked pt what he will do when discharged pt stated "whatever y'all have planned for me." Sw asked pt if he would consider going to a NH. 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 [place] asked pt again if he would consider going to a nursing home and pt adamantly said he would not. Sw explained to pt that he would likely have to go home. Pt stated this would ok. Sw informed pt this [place] will refer pt to [facility] to see if they can provide pt with additional assistance at home. [place] referred pt to [place] at [facility] via email.     Ji Samano, HEATH   [phone]    "

## 2017-05-22 NOTE — SUBJECTIVE & OBJECTIVE
Neurologic Chief Complaint: falls    Subjective:     Interval History: Patient is seen for follow-up neurological assessment and treatment recommendations: Some confusion overnight improved this morning. Mr. Sellers denies any complaints today.     Medications: I have not reviewed the current medication administration record.    Prescriptions Prior to Admission   Medication Sig Dispense Refill Last Dose    amlodipine (NORVASC) 10 MG tablet Take 10 mg by mouth once daily.   5/19/2017    aspirin (ECOTRIN) 325 MG EC tablet Take 325 mg by mouth once daily.   5/20/2017    atorvastatin (LIPITOR) 40 MG tablet Take 1 tablet (40 mg total) by mouth every evening. 30 tablet 2 5/19/2017    carvedilol (COREG) 25 MG tablet Take 25 mg by mouth 2 (two) times daily with meals.   5/20/2017    clopidogrel (PLAVIX) 75 mg tablet Take 1 tablet (75 mg total) by mouth once daily. 30 tablet 2 5/19/2017    hydrALAZINE (APRESOLINE) 50 MG tablet Take 50 mg by mouth 3 (three) times daily.   5/20/2017    lisinopril (PRINIVIL,ZESTRIL) 40 MG tablet Take 80 mg by mouth once daily.   5/20/2017    metformin (GLUCOPHAGE) 500 MG tablet Take 1 tablet (500 mg total) by mouth 2 (two) times daily with meals. 60 tablet 2 5/20/2017    multivitamin capsule Take 1 capsule by mouth once daily.   5/19/2017    oxybutynin (DITROPAN) 5 MG Tab Take 5 mg by mouth 2 (two) times daily.   5/20/2017    spironolactone (ALDACTONE) 25 MG tablet Take 25 mg by mouth once daily.       albuterol 90 mcg/actuation inhaler Inhale 2 puffs into the lungs every 6 (six) hours as needed for Wheezing or Shortness of Breath. 8.5 g 2 Taking    chlorthalidone (HYGROTEN) 50 MG Tab Take 100 mg by mouth once daily.   Taking    donepezil (ARICEPT) 10 MG tablet Take 10 mg by mouth 2 (two) times daily.   Taking    meclizine (ANTIVERT) 12.5 mg tablet Take 1 tablet (12.5 mg total) by mouth 3 (three) times daily as needed for Dizziness. 90 tablet 2 Taking    omega-3 fatty  acids-vitamin E (FISH OIL) 1,000 mg Cap Take 1,000 mg by mouth 2 (two) times daily.   Taking       Review of Systems   Constitutional: Negative for chills and fever.   HENT: Negative for congestion and rhinorrhea.    Eyes: Negative for discharge and redness.   Respiratory: Negative for cough and shortness of breath.    Cardiovascular: Negative for chest pain and palpitations.   Gastrointestinal: Negative for nausea and vomiting.   Genitourinary: Negative for dysuria and hematuria.   Musculoskeletal: Negative for myalgias and neck pain.   Skin: Negative for rash and wound.   Neurological: Positive for dizziness, facial asymmetry and weakness. Negative for speech difficulty, light-headedness, numbness and headaches.   Psychiatric/Behavioral: Positive for confusion and dysphoric mood. Negative for agitation.     Objective:     Vital Signs (Most Recent):  Temp: 97.6 °F (36.4 °C) (05/22/17 0813)  Pulse: 71 (05/22/17 0813)  Resp: 18 (05/22/17 0813)  BP: (!) 157/86 (05/22/17 0813)  SpO2: 99 % (05/22/17 0813)    Vital Signs Range (Last 24H):  Temp:  [96.1 °F (35.6 °C)-98.5 °F (36.9 °C)]   Pulse:  [53-77]   Resp:  [16-20]   BP: (152-209)/(68-92)   SpO2:  [97 %-99 %]     Physical Exam   Constitutional: He is oriented to person, place, and time. He appears well-developed and well-nourished. No distress.   HENT:   Head: Normocephalic and atraumatic.   Eyes: EOM are normal. Pupils are equal, round, and reactive to light.   Neck: Normal range of motion. Neck supple.   Cardiovascular: Normal rate and regular rhythm.    Pulmonary/Chest: Effort normal. No respiratory distress.   Abdominal: Soft. He exhibits no distension.   Musculoskeletal: He exhibits no edema or tenderness.   Neurological: He is alert and oriented to person, place, and time. A cranial nerve deficit is present. Coordination normal. GCS eye subscore is 4 - spontaneous. GCS verbal subscore is 5 - oriented. GCS motor subscore is 6 - obeys commands.   Skin: Skin is  warm and dry. He is not diaphoretic.   Psychiatric:   Disoriented but answers correctly on repeated questioning. States he is concerned he feels memory is going bad but joking about orientation questions then gives correct answer       Neurological Exam:   LOC: alert and follows requests  Language: No aphasia  Speech: No dysarthria  Orientation: Person, Place, Time  Memory: Recent memory intact, Remote memory intact, Age correct, Month correct  Visual Fields (CN II): Full  EOM (CN III, IV, VI): Full/intact  Pupils (CN III, IV, VI): PERRL  Facial Sensation (CN V): Symmetric   Facial Movement (CN VII): upper weakness left upper and left lower and lower weakness left upper and left lower  Tongue (CN XII): to midline  Motor*: Arm Left:  Normal (5/5), Leg Left:   Normal (5/5), Arm Right:   Normal (5/5), Leg Right:   Normal (5/5)  Cerebellar*: Normal limb  Sensation: intact to light touch, pain  Tone: Arm-Left: normal; Leg-Left: normal; Arm-Right: normal; Leg-Right: normal    Stroke Team Times:   Last Known Normal Date and Time : 2017  Symptom Onset Date and Time:2017    NIH Stroke Scale:  Interval: baseline (upon arrival/admit)  Level of Consciousness: 0 - alert  LOC Questions: 0 - answers both correctly  LOC Commands: 0 - performs both correctly  Best Gaze: 0 - normal  Visual: 0 - no visual loss  Facial Palsy: 2 - partial  Motor Left Arm: 0 - no drift  Motor Right Arm: 0 - no drift  Motor Left Le - no drift  Motor Right Le - no drift  Limb Ataxia: 0 - absent  Sensory: 0 - normal  Best Language: 0 - no aphasia  Dysarthria: 0 - normal articulation  Extinction and Inattention: 0 - no neglect  NIH Stroke Scale Total: 2  Catherine Coma Scale:  Best Eye Response: 4 - spontaneous  Best Motor Response: 6 - obeys commands  Best Verbal Response: 5 - oriented  [place] Coma Scale Total: 15  Modified Moody Scale:   Timeline: Prior to symptoms onset  Modified Tacho Score: 2 - slight  disability        Laboratory:  CMP:     Recent Labs  Lab 05/22/17  0528   CALCIUM 9.6   ALBUMIN 3.8   PROT 7.1      K 3.9   CO2 22*      BUN 17   CREATININE 1.0   ALKPHOS 67   ALT 30   AST 18   BILITOT 0.4     CBC:     Recent Labs  Lab 05/22/17  0528   WBC 6.87   RBC 5.22   HGB 15.1   HCT 43.9      MCV 84   MCH 28.9   MCHC 34.4       Diagnostic Results:  Brain Imaging: MRI Head. Punctate acute infarcts within the posterior amy with possible additional punctate focus within the anterior medulla.  No intracranial hemorrhage. Suspected high-grade stenosis versus occlusion of the proximal aspect of the V4 segment of the distal right vertebral artery.

## 2017-05-22 NOTE — PHYSICIAN QUERY
PT Name: Nick Sellers  MR #: [id number]    Physician Query Form - Neurological Condition Clarification       CDS/: Marilyn Farah               Contact information:[email or web address]    This form is a permanent document in the medical record.     Query Date: May 22, 2017    By submitting this query, we are merely seeking further clarification of documentation. Please utilize your independent clinical judgment when addressing the question(s) below.    The Medical record contains the following:   Indicators   Supporting Clinical Findings Location in Medical Record   x AMS, Confusion, LOC, etc. Confusion Vascular Neurology Progress note 5-22   x Acute / Chronic Illness CVA,HTN H&P    Radiology Findings      Electrolyte Imbalance     x Medication IV Hydralazine prn SBP>180 Mar 5-22    Treatment      Other XW=360 to 211 systolic          68 to 95 diastolic Vs record 5-21 to 5-22     Provider, please specify the diagnosis or diagnoses associated with above clinical findings.      [  ] Hypertensive Encephalopathy  [x  ] Other Encephalopathy  [  ] Other (please specify): _____________________________________          Please document in your progress notes daily for the duration of treatment until resolved, and  include in your discharge summary.

## 2017-05-22 NOTE — PT/OT/SLP PROGRESS
Speech Language Pathology  Treatment    Nick Sellers   MRN: [id number]   Admitting Diagnosis: stroke    Diet recommendations: Solid Diet Level: Regular  Liquid Diet Level: Thin   Aspiration Precautions:  HOB to 90 degrees, Small bites/sips, Check for pocketing/oral residue and Meds whole 1 at a time    SLP Treatment Date: 17  Speech Start Time: 956     Speech Stop Time: 1015     Speech Total (min): 19 min       TREATMENT BILLABLE MINUTES:  Speech Therapy Individual 9 and [facility] Care/Home Management Training 10    Has the patient been evaluated by SLP for swallowing? : Yes  Keep patient NPO?: No   General Precautions: Standard,            Subjective:  Pt awake; pleasant    Pain Ratin/10              Pain Rating Post-Intervention: 0/10    Objective:     Pt co memory difficulty. Pt read aloud 5 sentences with 100% acc indep. Pt solved simple problems involving math/time x9 with 44% acc indep, improving to 89% acc provided verbal and visual cues; benefited from self corrections. Pt answered orientation questions x4 with 75% acc indep; benefited from self corrections. Pt with active and appropriate engagement in basic conversation. Of note, pt stated he used to be a , but hasn't worked  In 5 years. Now, he enjoys volunteering at [employer]. Pt tolerated 6 oz thin liquid via cup and straw without s/s of airway compromise. Whiteboard updated with diet recommendations/aspiration precautions. Skilled education provided on aspiration precautions and diet recommendations. No further questions.                                       Assessment:  Nick Sellers is a 81 y.o. male with a medical diagnosis of stroke and presents with mild cognitive deficits, mild dysarthria, mild oral dysphagia. continued progress towards goals.      Discharge recommendations: Discharge Facility/Level Of Care Needs: outpatient speech therapy     Goals:    SLP Goals        Problem: SLP Goal    Goal Priority Disciplines Outcome   SLP  Goal     SLP    Description:  Speech Language Pathology Goals  Goals expected to be met by 5/28/17  1.  Pt will tolerate regular diet and thin liquid with adequate oral management and no overt s/s of aspiration.   2.  Further assess reading/writing/visual-spatial skills and basic math/time skills s/p stroke.  3.  Pt will complete oral motor exercises x10 to improve lingual/labial strength and rom.   4.  Pt will recall 3/3 speech intelligibility strategies.   5.  Pt will complete short term memory tasks with 80% accuracy, to improve cognitive skills.                      Plan:   Patient to be seen Therapy Frequency: 5 x/week   Plan of Care expires: 06/19/17  Plan of Care reviewed with: patient  SLP Follow-up?: Yes  SLP - Next Visit Date: 05/23/17          Pascale Real M.A. CCC-SLP  Speech Language Pathologist  [phone]  5/22/2017

## 2017-05-22 NOTE — PT/OT/SLP PROGRESS
"Physical Therapy  Treatment    Nick Sellers   MRN: [id number]   Admitting Diagnosis: Acute brainstem infarction    PT Received On: 17  PT Start Time: 837     PT Stop Time: 904    PT Total Time (min): 27 min       Billable Minutes:  Gait Training 12 and Therapeutic Activity 13    Treatment Type: Treatment  PT/PTA: PT           General Precautions: Standard, fall    Subjective:  Communicated with RN (Simi) prior to session.    "I think I am losing my mind. I am not sure what is happening. This has never happened before." pt states "I am so worried about my wife. I want to be able to take care of her."    Pain Ratin/10 (upon PT arrival; pt with 8/10 HA and reports meds provided for HA recently)  Pain Rating Post-Intervention: 0/10 (pt resting UIC)    Objective:   Patient found with: telemetry    Functional Mobility:  Bed Mobility:   Rolling/Turning to Left: Independent  Scooting/Bridging: Independent  Supine to Sit: Independent  Sit to Supine:  (pt remained sitting UIC)    Transfers:  Sit <> Stand Assistance: Modified Independent (using RW from EOB)  Sit <> Stand Assistive Device: No Assistive Device  Bed <> Chair Technique: Stand Pivot  Bed <> Chair Assistance: Supervision    Gait:   Gait Distance: x200 feet in hallway setting with use of RW and SBA from PT; pt demo x2 LOB req balance recovery from PT of CGA. Pt demo good safety awareness during turns and navigating objects. VC/TCs for relaxation of upper trapezius and and B hands on RW.  Assistance 1: Stand by Assistance, Contact Guard Assistance  Gait Assistive Device: Rolling walker  Gait Pattern: reciprocal  Gait Deviation(s): decreased derrick, decreased weight-shifting ability, foot flat, hip hike, forward lean    Balance:   Static Sit: GOOD-: Takes MODERATE challenges from all directions but inconsistently  Dynamic Sit: GOOD-: Maintains balance through MODERATE excursions of active trunk movement,     Static Stand: FAIR: Maintains without assist but " unable to take challenges  Dynamic stand: FAIR+: Needs CLOSE SUPERVISION during gait and is able to right self with minor LOB while using RW    Therapeutic Activities and Exercises:  PT arrived to pt's room upon x3 occasions; upon 3rd attempt pt available and agreeable for PT session. Pt came to sitting EOB as above. Daily orientation provided and pt demo orientation to self and hospital only. Pt reports HA during gait training; however, improving. Pt with multiple standing breaks during gait assessment 2/2 tearful nature regarding his spouse and current situation. Pt assisted back to room and PT inquiring regarding long term plan for himself and his spouse. Deferred to CM for planning; discussed with CM (Flavia) and OT (Cathryn) regarding pt's current status. Pt agreeable to remain UIC; RN and PCT aware of pt's current status and mobility needs. Questions/concerns addressed within PT scope of practice. Upon PT exit, pt oriented to person, place and day of week.     AM-PAC 6 CLICK MOBILITY  How much help from another person does this patient currently need?   1 = Unable, Total/Dependent Assistance  2 = A lot, Maximum/Moderate Assistance  3 = A little, Minimum/Contact Guard/Supervision  4 = None, Modified [place]/Independent    Turning over in bed (including adjusting bedclothes, sheets and blankets)?: 4  Sitting down on and standing up from a chair with arms (e.g., wheelchair, bedside commode, etc.): 4  Moving from lying on back to sitting on the side of the bed?: 4  Moving to and from a bed to a chair (including a wheelchair)?: 4  Need to walk in hospital room?: 3  Climbing 3-5 steps with a railing?: 3  Total Score: 22    AM-PAC Raw Score CMS G-Code Modifier Level of Impairment Assistance   6 % Total / Unable   7 - 9 CM 80 - 100% Maximal Assist   10 - 14 CL 60 - 80% Moderate Assist   15 - 19 CK 40 - 60% Moderate Assist   20 - 22 CJ 20 - 40% Minimal Assist   23 CI 1-20% SBA / CGA   24 CH 0% Independent/  Mod I     Patient left up in chair with all lines intact, call button in reach and RN notified.    Assessment:  Nick Sellers is a 81 y.o. male with a medical diagnosis of Acute brainstem infarction and presents with improved functional mobility; but a decline in cognitive status. Pt remains largely confused and disoriented; however, able to ambulate with SBA using RW. Pt able to follow all commands and participate with PT in gait trial. Emotional support provided throughout session. Will progress as tolerated. Patient will benefit from continued PT services to address:    Rehab identified problem list/impairments: Rehab identified problem list/impairments: impaired endurance, impaired self care skills, impaired functional mobilty, gait instability, impaired cognition, impaired balance, pain    Rehab potential is good.    Activity tolerance: Good    Discharge recommendations: Discharge Facility/Level Of Care Needs: outpatient PT (OP PT recommended initially; however, pt with cognitive decline; may benefit from  services)     Barriers to discharge: Barriers to Discharge: Inaccessible home environment, Decreased caregiver support    Equipment recommendations: Equipment Needed After Discharge: walker, rolling     GOALS:    Physical Therapy Goals        Problem: Physical Therapy Goal    Goal Priority Disciplines Outcome Goal Variances Interventions   Physical Therapy Goal     PT/OT, PT Ongoing (interventions implemented as appropriate)     Description:  Goals to be met by: 2017     Patient will increase functional independence with mobility by performin. Supine to sit with [place]  2. Sit to supine with [place]  3. Sit to stand transfer with [place]  4. Bed to chair transfer with [place] and No AD  5. Gait x200 feet with Supervision using rolling walker  6. Stand for x10 minutes with Stand-by Assistance while performing dynamic balance tasks  7. Lower extremity exercise program x10  reps with supervision to improve coordination and strength                    PLAN:    Patient to be seen 6 x/week  to address the above listed problems via gait training, therapeutic activities, therapeutic exercises, neuromuscular re-education  Plan of Care expires: 06/20/17  Plan of Care reviewed with: patient     Yarelis DELFINA Estrada, PT, DPT  [phone]  5/22/2017

## 2017-05-22 NOTE — PT/OT/SLP PROGRESS
"Occupational Therapy  Treatment    Nick Sellers   MRN: [id number]   Admitting Diagnosis:Arterial ischemic stroke, vertebrobasilar, brainstem, acute  OT Date of Treatment: 17   OT Start Time: 1100  OT Stop Time: 1128  OT Total Time (min): 28 min    Billable Minutes:  Therapeutic Activity 28    General Precautions: Standard, aspiration, fall, diabetic  Orthopedic Precautions: N/A  Braces: N/A    Do you have any cultural, spiritual, Religion conflicts, given your current situation?: [place]    Subjective:  Communicated with RN prior to session.  Pt reported "My memory is getting badder and badder"    Pain Ratin/10  Pain Rating Post-Intervention: 0/10    Objective:  Patient found with: telemetry, peripheral IV     Functional Mobility:  Bed Mobility: Not completed; Pt sitting in chair upon arrival    Transfers:   Sit <> Stand Assistance: Contact Guard Assistance  Sit <> Stand Assistive Device: No Assistive Device  Bed <> Chair Technique: Stand Pivot  Bed <> Chair Transfer Assistance: Minimum Assistance (to/from chair to EOB; assist 2/2 severe R lateral lean and LOB)  Bed <> Chair Assistive Device: No Assistive Device    Functional Ambulation: Min(A) for short steps to/from chair; R lateral lean    Activities of Daily Living:  UE Dressing Level of Assistance: Set-up Assistance, Stand by assistance (to don gown as jacket while seated EOB)  LE Dressing Level of Assistance: Contact guard, Set-up Assistance (to doff/don B socks while seated EOB)  Grooming Position: Seated, EOB (2/2 impaired balance on this date)  Grooming Level of Assistance: Stand by assistance (oral care; to apply lotion to B LE)    Balance:   Static Stand: CGA 2/2 R lateral lean    Additional:  -Pt alert and oriented to person and place; disoriented to time (, )  -Provided verbal daily orientation with poor short term carry over- OT to provide correct date on communication board  -Discussed memory assistance strategies (ie: using phone as " reference for date/time)  -Completed sit<>stand x5 reps from EOB with CGA-min(A) - Pt with noted R lateral lean and poor midline; therefore, grooming completed in sitting on this date  -Pt requiring assistance for B coordination task of tie for closure of gown  -Communication board updated; no family present for education    AM-PAC 6 CLICK ADL   How much help from another person does this patient currently need?   1 = Unable, Total/Dependent Assistance  2 = A lot, Maximum/Moderate Assistance  3 = A little, Minimum/Contact Guard/Supervision  4 = None, Modified [place]/Independent    Putting on and taking off regular lower body clothing? : 3  Bathing (including washing, rinsing, drying)?: 3  Toileting, which includes using toilet, bedpan, or urinal? : 3  Putting on and taking off regular upper body clothing?: 3  Taking care of personal grooming such as brushing teeth?: 3  Eating meals?: 3  Total Score: 18     AM-PAC Raw Score CMS G-Code Modifier Level of Impairment Assistance   6 % Total / Unable   7 - 9 CM 80 - 100% Maximal Assist   10 - 14 CL 60 - 80% Moderate Assist   15 - 19 CK 40 - 60% Moderate Assist   20 - 22 CJ 20 - 40% Minimal Assist   23 CI 1-20% SBA / CGA   24 CH 0% Independent/ Mod I     Patient left up in chair with all lines intact, call button in reach, RN notified and EVANS GALINDO elevated in chair    ASSESSMENT:  Nick Sellers is a 81 y.o. male with a medical diagnosis of Arterial ischemic stroke, vertebrobasilar, brainstem, acute and presents with impaired balance, impaired cognition, impaired safety awareness and overall decline in functional indep with daily tasks and activities. 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 He verbalizes high history of falls onto R UE/side. He remains a fall risk at this time. He would benefit from more long term placement for best safety.    Rehab identified problem list/impairments: Rehab identified problem list/impairments: impaired endurance, impaired self  care skills, impaired functional mobilty, gait instability, impaired balance, impaired cognition, decreased safety awareness, decreased coordination    Rehab potential is good.    Activity tolerance: Good    Discharge recommendations: Discharge Facility/Level Of Care Needs: outpatient OT (would benefit from longterm placement)     Barriers to discharge: Barriers to Discharge: Inaccessible home environment, Decreased caregiver support (Pt is caretaker to spouse)    Equipment recommendations: walker, rolling, bedside commode, shower chair     GOALS:    Occupational Therapy Goals        Problem: Occupational Therapy Goal    Goal Priority Disciplines Outcome Interventions   Occupational Therapy Goal     OT, PT/OT Ongoing (interventions implemented as appropriate)    Description:  Goals to be met by: 5/28/17     Patient will increase functional independence with ADLs by performing:    UE Dressing with Modified [place].  LE Dressing with Modified [place].  Grooming while standing with Modified [place].  Toileting from toilet with Modified [place] for hygiene and clothing management.   Rolling to Bilateral with Modified [place].   Supine to sit with Modified [place].  Stand pivot transfers with Modified [place].                      Plan:  Patient to be seen 6 x/week to address the above listed problems via self-care/home management, therapeutic activities, therapeutic exercises, neuromuscular re-education, cognitive retraining  Plan of Care expires: 06/20/17  Plan of Care reviewed with: patient    JORDAN Masters  05/22/2017

## 2017-05-22 NOTE — PLAN OF CARE
Problem: Patient Care Overview  Goal: Plan of Care Review  Outcome: Ongoing (interventions implemented as appropriate)  Plan of care reviewed with Pt, Pt able to verbalize understanding and acceptance but has new onset of confusion. Pt free from falls or injury, bed alarm set, dizziness present. No new skin breakdown, pt had c/o HA. Neuro changes during shift (see note) No s/sx of distress noted. WCTM     Temp:  [97.5 °F (36.4 °C)-98.5 °F (36.9 °C)]   Pulse:  [53-64]   Resp:  [16-20]   BP: (154-209)/(68-92)   SpO2:  [97 %-98 %]

## 2017-05-22 NOTE — PROGRESS NOTES
[facility]  Vascular Neurology  Comprehensive Stroke Center  Progress Note      Assessment/Plan:     No notes on file    Arterial ischemic stroke, vertebrobasilar, brainstem, acute    80 y/o PMH HTN, DM2, HLD, remote right carona radiata infarct w/o residual deficits who presents with chief complaint of falls. Admitted to [facility] with acute bilateral cerebellar infarcts. No tPA given 2/2 out of window and no thrombectomy recommended by Telemedicine at the time given no large vessel dz. Discharged home with possible worsening of balance issues though may be due to previous CVA. Not a candidate for tPA given recent CVA    Antithrombotics: ASA 325mg, plavix  Statins: Lipitor 40mg qd  PT/OT rec HH which as same as [facility]  MRI Brain with punctate acute infarcts of posterior amy and punctate foci in anterior medulla, will compare to OSH MRI reporting cerebellar infarcts. 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 which was seen at OSH [facility]  TSH 1.6, LDL 68, A1c 7.3        Hypertension    Stroke risk factor  -Restarted amlodipine 10mg  -Restarted coreg 25mg BID  -Restarted lisinopril 40mg qd  -Started chlorthalidone 100mg qd  -Holding home spironolactone 25mg qd  -Holding home hydralazine 50mg TID  -Hydralazine IV PRN for SBP > 180        Type 2 diabetes mellitus with complication, without long-term current use of insulin    Stroke risk factor  -A1c 7.3, A1c goal 7-8 given age  -Only on metformin 500mg po BID at home  -LDSSI        CAD S/P percutaneous coronary angioplasty    -Restart aspirin/plavix if no bleed or new CVA        HLD (hyperlipidemia)    Stroke risk factor  -Continue atorvastatin 40mg qd        Cerebral aneurysm, nonruptured    -3mm PCOM aneurysm from OSH imaging, also present on 2013 imaging ([facility])            Neurologic Chief Complaint: falls    Subjective:     Interval History: Patient is seen for follow-up neurological assessment and treatment recommendations: Some confusion  overnight improved this morning. Mr. Sellers denies any complaints today.     Medications: I have not reviewed the current medication administration record.    Prescriptions Prior to Admission   Medication Sig Dispense Refill Last Dose    amlodipine (NORVASC) 10 MG tablet Take 10 mg by mouth once daily.   5/19/2017    aspirin (ECOTRIN) 325 MG EC tablet Take 325 mg by mouth once daily.   5/20/2017    atorvastatin (LIPITOR) 40 MG tablet Take 1 tablet (40 mg total) by mouth every evening. 30 tablet 2 5/19/2017    carvedilol (COREG) 25 MG tablet Take 25 mg by mouth 2 (two) times daily with meals.   5/20/2017    clopidogrel (PLAVIX) 75 mg tablet Take 1 tablet (75 mg total) by mouth once daily. 30 tablet 2 5/19/2017    hydrALAZINE (APRESOLINE) 50 MG tablet Take 50 mg by mouth 3 (three) times daily.   5/20/2017    lisinopril (PRINIVIL,ZESTRIL) 40 MG tablet Take 80 mg by mouth once daily.   5/20/2017    metformin (GLUCOPHAGE) 500 MG tablet Take 1 tablet (500 mg total) by mouth 2 (two) times daily with meals. 60 tablet 2 5/20/2017    multivitamin capsule Take 1 capsule by mouth once daily.   5/19/2017    oxybutynin (DITROPAN) 5 MG Tab Take 5 mg by mouth 2 (two) times daily.   5/20/2017    spironolactone (ALDACTONE) 25 MG tablet Take 25 mg by mouth once daily.       albuterol 90 mcg/actuation inhaler Inhale 2 puffs into the lungs every 6 (six) hours as needed for Wheezing or Shortness of Breath. 8.5 g 2 Taking    chlorthalidone (HYGROTEN) 50 MG Tab Take 100 mg by mouth once daily.   Taking    donepezil (ARICEPT) 10 MG tablet Take 10 mg by mouth 2 (two) times daily.   Taking    meclizine (ANTIVERT) 12.5 mg tablet Take 1 tablet (12.5 mg total) by mouth 3 (three) times daily as needed for Dizziness. 90 tablet 2 Taking    omega-3 fatty acids-vitamin E (FISH OIL) 1,000 mg Cap Take 1,000 mg by mouth 2 (two) times daily.   Taking       Review of Systems   Constitutional: Negative for chills and fever.   HENT:  Negative for congestion and rhinorrhea.    Eyes: Negative for discharge and redness.   Respiratory: Negative for cough and shortness of breath.    Cardiovascular: Negative for chest pain and palpitations.   Gastrointestinal: Negative for nausea and vomiting.   Genitourinary: Negative for dysuria and hematuria.   Musculoskeletal: Negative for myalgias and neck pain.   Skin: Negative for rash and wound.   Neurological: Positive for dizziness, facial asymmetry and weakness. Negative for speech difficulty, light-headedness, numbness and headaches.   Psychiatric/Behavioral: Positive for confusion and dysphoric mood. Negative for agitation.     Objective:     Vital Signs (Most Recent):  Temp: 97.6 °F (36.4 °C) (05/22/17 0813)  Pulse: 71 (05/22/17 0813)  Resp: 18 (05/22/17 0813)  BP: (!) 157/86 (05/22/17 0813)  SpO2: 99 % (05/22/17 0813)    Vital Signs Range (Last 24H):  Temp:  [96.1 °F (35.6 °C)-98.5 °F (36.9 °C)]   Pulse:  [53-77]   Resp:  [16-20]   BP: (152-209)/(68-92)   SpO2:  [97 %-99 %]     Physical Exam   Constitutional: He is oriented to person, place, and time. He appears well-developed and well-nourished. No distress.   HENT:   Head: Normocephalic and atraumatic.   Eyes: EOM are normal. Pupils are equal, round, and reactive to light.   Neck: Normal range of motion. Neck supple.   Cardiovascular: Normal rate and regular rhythm.    Pulmonary/Chest: Effort normal. No respiratory distress.   Abdominal: Soft. He exhibits no distension.   Musculoskeletal: He exhibits no edema or tenderness.   Neurological: He is alert and oriented to person, place, and time. A cranial nerve deficit is present. Coordination normal. GCS eye subscore is 4 - spontaneous. GCS verbal subscore is 5 - oriented. GCS motor subscore is 6 - obeys commands.   Skin: Skin is warm and dry. He is not diaphoretic.   Psychiatric:   Disoriented but answers correctly on repeated questioning. States he is concerned he feels memory is going bad but joking  about orientation questions then gives correct answer       Neurological Exam:   LOC: alert and follows requests  Language: No aphasia  Speech: No dysarthria  Orientation: Person, Place, Time  Memory: Recent memory intact, Remote memory intact, Age correct, Month correct  Visual Fields (CN II): Full  EOM (CN III, IV, VI): Full/intact  Pupils (CN III, IV, VI): PERRL  Facial Sensation (CN V): Symmetric   Facial Movement (CN VII): upper weakness left upper and left lower and lower weakness left upper and left lower  Tongue (CN XII): to midline  Motor*: Arm Left:  Normal (5/5), Leg Left:   Normal (5/5), Arm Right:   Normal (5/5), Leg Right:   Normal (5/5)  Cerebellar*: Normal limb  Sensation: intact to light touch, pain  Tone: Arm-Left: normal; Leg-Left: normal; Arm-Right: normal; Leg-Right: normal    Stroke Team Times:   Last Known Normal Date and Time : 2017  Symptom Onset Date and Time:2017    NIH Stroke Scale:  Interval: baseline (upon arrival/admit)  Level of Consciousness: 0 - alert  LOC Questions: 0 - answers both correctly  LOC Commands: 0 - performs both correctly  Best Gaze: 0 - normal  Visual: 0 - no visual loss  Facial Palsy: 2 - partial  Motor Left Arm: 0 - no drift  Motor Right Arm: 0 - no drift  Motor Left Le - no drift  Motor Right Le - no drift  Limb Ataxia: 0 - absent  Sensory: 0 - normal  Best Language: 0 - no aphasia  Dysarthria: 0 - normal articulation  Extinction and Inattention: 0 - no neglect  NIH Stroke Scale Total: 2  Catherine Coma Scale:  Best Eye Response: 4 - spontaneous  Best Motor Response: 6 - obeys commands  Best Verbal Response: 5 - oriented  [place] Coma Scale Total: 15  Modified [place] Scale:   Timeline: Prior to symptoms onset  Modified [place] Score: 2 - slight disability        Laboratory:  CMP:     Recent Labs  Lab 17  0528   CALCIUM 9.6   ALBUMIN 3.8   PROT 7.1      K 3.9   CO2 22*      BUN 17   CREATININE 1.0   ALKPHOS 67   ALT 30   AST 18    BILITOT 0.4     CBC:     Recent Labs  Lab 05/22/17  0528   WBC 6.87   RBC 5.22   HGB 15.1   HCT 43.9      MCV 84   MCH 28.9   MCHC 34.4       Diagnostic Results:  Brain Imaging: MRI Head. Punctate acute infarcts within the posterior amy with possible additional punctate focus within the anterior medulla.  No intracranial hemorrhage. Suspected high-grade stenosis versus occlusion of the proximal aspect of the V4 segment of the distal right vertebral artery.        Moshe Arthur IV, MD  Comprehensive Stroke Center  Department of Vascular Neurology   [facility]

## 2017-05-22 NOTE — PLAN OF CARE
Problem: Occupational Therapy Goal  Goal: Occupational Therapy Goal  Goals to be met by: 5/28/17     Patient will increase functional independence with ADLs by performing:    UE Dressing with Modified [place].  LE Dressing with Modified [place].  Grooming while standing with Modified [place].  Toileting from toilet with Modified [place] for hygiene and clothing management.   Rolling to Bilateral with Modified [place].   Supine to sit with Modified [place].  Stand pivot transfers with Modified [place].     Outcome: Ongoing (interventions implemented as appropriate)  Pt with noted cognitive deficits and impaired balance on this date. 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 JORDAN Masters 5/22/2017

## 2017-05-22 NOTE — CONSULTS
Consult Note  Physical Medicine & Rehab    Consult Requested By:  Param Parra MD      Admit Date:  5/20/2017  Admitting Diagnosis:  Cerebellar infarction [I63.9]    Reason for Consult:  assess rehabilitation needs    SUBJECTIVE:   History of Present Illness:  Nick Sellers is a 81-year-old male with PMHx of HTN, HLD, DMII, CAD, colon cancer, and CVA (R CR infarct without residual deficits).  Patient was recently admitted to [facility] 5/16/17-5/18/17 for acute bilateral cerebellar infarcts and was discharged home with outpatient therapy.  He presented to [facility] on 5/20/17 with worsening weakness, blurry vision, and balance impairment with subsequent falls at home. MRI showed punctate infarcts of the posterior amy and high grade stenosis of R vertebral artery.    Current Functional Status:  Participating with therapy. Bed mobility independent.  Sit to stand mod I and transfers SV.  Ambulated 200 ft SBA-CGA.  UBD SBA and LBD Dora.    Functional History: Patient lives in [place], [place], with his wife and disabled granddaughter in a single story home without steps to enter.  Prior to admission, he was mod I with ADLs and mobility.  Ambulated with rollator or SPC.  Primary caregiver for his wife.  DME: rollator, SPC.    Review of Systems  Constitutional: No fever or chills.  No malaise or fatigue.  Skin: No rashes or lesions.   Eyes: No visual changes.  ENT: No nasal congestion, sore throat, or ear pain.  No difficulty swallowing.   Respiratory: No shortness of breath or wheezing.  No cough.  Cardiovascular: No chest pain or palpitations.  No edema.   Gl: No nausea or vomiting.  No abdominal pain.  No incontinence of bowel.  No constipation.   : No incontinence of bladder.   Musculoskeletal: No arthralgias.  No bone pain.   Neurological: No seizures or tremors.  Positive balance difficulty or gait problems.  Positive memory loss.     Behavioral/Psych: No changes in mood or hallucinations.    Past Medical  History:   Diagnosis Date    CAD S/P percutaneous coronary angioplasty     Colon cancer     received radiation    Diabetes mellitus     High cholesterol 12/3/13    Hyperlipidemia     Hypertension     Prostate cancer      Past Surgical History:   Procedure Laterality Date    CORONARY ANGIOPLASTY WITH STENT PLACEMENT      1998, 2001     Family History   Problem Relation Age of Onset    Throat cancer Mother     Prostate cancer Father     Diabetes Mellitus Brother     Diabetes Mellitus Sister     Stroke Neg Hx      Social History   Substance Use Topics    Smoking status: Former Smoker     Quit date: 12/6/1998    Smokeless tobacco: Not on file    Alcohol use No     Review of patient's allergies indicates:   Allergen Reactions    Pcn [penicillins]      Patient states he has been paralyzed when given PCN in the past         Scheduled Medications:   amlodipine  10 mg Oral Daily    aspirin  325 mg Oral Daily    atorvastatin  40 mg Oral QHS    carvedilol  25 mg Oral BID WM    chlorthalidone  100 mg Oral Daily    clopidogrel  75 mg Oral Daily    heparin (porcine)  5,000 Units Subcutaneous Q8H    lisinopril  40 mg Oral Daily    sodium chloride 0.9%  3 mL Intravenous Q8H     PRN Medications:  acetaminophen, dextrose 50%, glucagon (human recombinant), hydrALAZINE, insulin aspart, sodium chloride 0.9%    OBJECTIVE:     Vital Signs (Most Recent)  Temp: 97.6 °F (36.4 °C) (05/22/17 0813)  Pulse: 60 (05/22/17 1100)  Resp: 18 (05/22/17 0813)  BP: (!) 157/86 (05/22/17 0813)  SpO2: 99 % (05/22/17 0813)    Vital Signs Range (Last 24H):  Temp:  [96.1 °F (35.6 °C)-98.5 °F (36.9 °C)]   Pulse:  [53-77]   Resp:  [16-20]   BP: (152-209)/(68-92)   SpO2:  [97 %-99 %]     Physical Exam:  Vital signs reviewed.   General appearance:  No apparent distress.  Appears well-developed and well-nourished.  Skin:  Color and texture normal.  Warm and dry.  No jaundice.  No visible rashes or visible lesions.  HEENT:  Normocephalic  and atraumatic.  No scleral icterus.  No nasal discharge.    Pulmonary:  Normal respiratory effort.  No cough.  Cardiac:  Normal heart rate.  Extremities: No calf tenderness.  Distal pulses intact.  No edema.    Abdomen:  Soft, non-tender and non-distended.  Musculoskeletal:  Moves all extremities spontaneously.  ROM: normal.    Neurologic:  -  Mental Status:  AAOx3.  Follows commands.  Answers correct age and .   -  Speech and language:  no aphasia or dysarthria.    -  Coordination:  Finger to nose exam:  RUE dymetria, LUE dysmetria.   -  Motor:  No pronator drift. RUE: .  LUE: .  RLE: .  LLE: .  -  Tone:  Normal.   Behavioral/Psych: Calm and cooperative.    Diagnostic Results:  CT: Reviewed  MRI: Reviewed  Labs: Reviewed    ASSESSMENT/PLAN:      Nick Sellers is a 81 y.o. male admitted on 2017 for worsening weakness, blurry vision, and balance impairment with subsequent falls at home.  MRI showed punctate infarcts of the posterior amy and high grade stenosis of R vertebral artery.  Not tPA or vascular interventional candidate.      PM&R consulted to assess rehabilitation needs.     Functional status: Bed mobility independent.  Sit to stand mod I and transfers SV.  Ambulated 200 ft SBA-CGA.  UBD SBA and LBD Dora.  Cognitive/Speech/Language status: mild cognitive deficits, mild dysarthria, mild oral dysphagia  Nutrition/Swallow Status:  Passed bedside swallow evaluation.  Speech recommended regular diet and thin liquids.    -  Reviewed discharge options with patient (inpatient rehab, SNF, home health therapy, and outpatient therapy).  Encourage participation with therapy.  -  Recommendations  · PT and OT evaluate and treat.   · SLP cognitive and speech evaluate and treat.   · Mobility, OOB in chair at least 3 hours per day, and early ambulation as appropriate.  · Establish consistent sleep-wake cycle and monitor for sleep disturbances.  · Monitor for bowel and bladder dysfunction.   · Monitor  for skin breakdown and pressure ulcers.  Turn patient every 2 hours and repositioning/weight shifting every 20-30 minutes when sitting.  Pressure relief/heel protector boots and proper mattress/overlay and chair cushioning.   · DVT prophylaxis:  Heparin scheduled.    Doing well with therapy.  Patient with limited goals for IPR.  Agree with therapy recommendations for home health vs outpatient therapy.  Will sign off.  Please call with questions/concerns or re-consult if situation changes.    Thank you for consult.    STEWART Montanez, FNP-C    Physical Medicine & Rehabilitation   05/22/2017  Spectralink: [id number]    Collaborating Physician : Easton Rodriguez MD

## 2017-05-22 NOTE — PROGRESS NOTES
"Bed alarm going off, RN Akiko and HANY Villarreal at bedside, pt naked and standing, trying to walk out of room. Pt confused, disoriented x4. Pt c/o full body tingling, and states "the room is falling".     0350- Orientation continues to fluctuate, bp 208/72 manually- prn hydralazine administered.    0400- pt bp 204/88 manually, pt disoriented to time. Pt has c/o HA to left side of dead with rating 10/10. Marizol Urban with stroke team notified, instructed to give second dose of hydralazine 10mg now. Prn tylenol administered for HA. WCTM    0600- pt only oriented to self and place, states "i think my name is Nick, I'm not sure". BP is 190/92 manually. C/o HA is 8/10. Stroke team notified, instructed to give am dose of carvedilol early. WCTM   "

## 2017-05-22 NOTE — PLAN OF CARE
Problem: SLP Goal  Goal: SLP Goal  Speech Language Pathology Goals  Goals expected to be met by 5/28/17  1.  Pt will tolerate regular diet and thin liquid with adequate oral management and no overt s/s of aspiration.   2.  Further assess reading/writing/visual-spatial skills and basic math/time skills s/p stroke.  3.  Pt will complete oral motor exercises x10 to improve lingual/labial strength and rom.   4.  Pt will recall 3/3 speech intelligibility strategies.   5.  Pt will complete short term memory tasks with 80% accuracy, to improve cognitive skills.      Pt with continued progress towards goals.    Pascale Real M.A. CCC-SLP  Speech Language Pathologist  [phone]  5/22/2017

## 2017-05-22 NOTE — PLAN OF CARE
Problem: Physical Therapy Goal  Goal: Physical Therapy Goal  Goals to be met by: 2017     Patient will increase functional independence with mobility by performin. Supine to sit with [place]  2. Sit to supine with [place]  3. Sit to stand transfer with [place]  4. Bed to chair transfer with [place] and No AD  5. Gait x200 feet with Supervision using rolling walker  6. Stand for x10 minutes with Stand-by Assistance while performing dynamic balance tasks  7. Lower extremity exercise program x10 reps with supervision to improve coordination and strength     Outcome: Ongoing (interventions implemented as appropriate)    Goals updated and appropriate to reflect pt's current mobility needs. Pt progressing toward goals; remains disorientated at this time.    Yarelis Estrada, PT, DPT  [phone]  2017

## 2017-05-22 NOTE — PLAN OF CARE
Problem: Patient Care Overview  Goal: Plan of Care Review  Outcome: Ongoing (interventions implemented as appropriate)  POC reviewed with pt, pt verbalized understanding but remains confused. Pt visited by  and family, pt became more oriented. Up in chair most of day with no c/o pain. Appetite good.

## 2017-05-23 VITALS
OXYGEN SATURATION: 97 % | SYSTOLIC BLOOD PRESSURE: 139 MMHG | DIASTOLIC BLOOD PRESSURE: 63 MMHG | HEART RATE: 75 BPM | TEMPERATURE: 98 F | RESPIRATION RATE: 18 BRPM | HEIGHT: 71 IN | BODY MASS INDEX: 36.4 KG/M2 | WEIGHT: 260 LBS

## 2017-05-23 LAB
POCT GLUCOSE: 127 MG/DL (ref 70–110)
POCT GLUCOSE: 165 MG/DL (ref 70–110)

## 2017-05-23 PROCEDURE — 99233 SBSQ HOSP IP/OBS HIGH 50: CPT | Mod: ,,, | Performed by: PSYCHIATRY & NEUROLOGY

## 2017-05-23 PROCEDURE — 97116 GAIT TRAINING THERAPY: CPT

## 2017-05-23 PROCEDURE — 25000003 PHARM REV CODE 250: Performed by: HOSPITALIST

## 2017-05-23 PROCEDURE — 97530 THERAPEUTIC ACTIVITIES: CPT

## 2017-05-23 PROCEDURE — 63600175 PHARM REV CODE 636 W HCPCS: Performed by: HOSPITALIST

## 2017-05-23 RX ORDER — LISINOPRIL 40 MG/1
40 TABLET ORAL DAILY
Qty: 90 TABLET | Refills: 0 | Status: SHIPPED | OUTPATIENT
Start: 2017-05-23

## 2017-05-23 RX ORDER — LISINOPRIL 40 MG/1
40 TABLET ORAL DAILY
Qty: 90 TABLET | Refills: 0 | Status: SHIPPED | OUTPATIENT
Start: 2017-05-23 | End: 2017-05-23

## 2017-05-23 RX ORDER — CHLORTHALIDONE 50 MG/1
100 TABLET ORAL DAILY
Qty: 180 TABLET | Refills: 0 | Status: SHIPPED | OUTPATIENT
Start: 2017-05-23 | End: 2017-08-21

## 2017-05-23 RX ORDER — SPIRONOLACTONE 25 MG/1
25 TABLET ORAL DAILY
Status: DISCONTINUED | OUTPATIENT
Start: 2017-05-23 | End: 2017-05-23 | Stop reason: HOSPADM

## 2017-05-23 RX ORDER — CHLORTHALIDONE 50 MG/1
100 TABLET ORAL DAILY
Qty: 180 TABLET | Refills: 0 | Status: SHIPPED | OUTPATIENT
Start: 2017-05-23 | End: 2017-05-23

## 2017-05-23 RX ORDER — LISINOPRIL 20 MG/1
40 TABLET ORAL NIGHTLY
Status: DISCONTINUED | OUTPATIENT
Start: 2017-05-23 | End: 2017-05-23 | Stop reason: HOSPADM

## 2017-05-23 RX ADMIN — CLOPIDOGREL 75 MG: 75 TABLET, FILM COATED ORAL at 08:05

## 2017-05-23 RX ADMIN — SPIRONOLACTONE 25 MG: 25 TABLET, FILM COATED ORAL at 08:05

## 2017-05-23 RX ADMIN — CHLORTHALIDONE 100 MG: 50 TABLET ORAL at 08:05

## 2017-05-23 RX ADMIN — HEPARIN SODIUM 5000 UNITS: 5000 INJECTION, SOLUTION INTRAVENOUS; SUBCUTANEOUS at 01:05

## 2017-05-23 RX ADMIN — AMLODIPINE BESYLATE 10 MG: 10 TABLET ORAL at 08:05

## 2017-05-23 RX ADMIN — HEPARIN SODIUM 5000 UNITS: 5000 INJECTION, SOLUTION INTRAVENOUS; SUBCUTANEOUS at 05:05

## 2017-05-23 RX ADMIN — SODIUM CHLORIDE, PRESERVATIVE FREE 3 ML: 5 INJECTION INTRAVENOUS at 05:05

## 2017-05-23 RX ADMIN — CARVEDILOL 25 MG: 25 TABLET, FILM COATED ORAL at 07:05

## 2017-05-23 RX ADMIN — ASPIRIN 325 MG: 325 TABLET, DELAYED RELEASE ORAL at 08:05

## 2017-05-23 RX ADMIN — HYDRALAZINE HYDROCHLORIDE 10 MG: 20 INJECTION INTRAMUSCULAR; INTRAVENOUS at 05:05

## 2017-05-23 NOTE — DISCHARGE SUMMARY
[facility]  Vascular Neurology  Comprehensive Stroke Center  Discharge Summary     Assessment/Plan:     Interventions: None    Complications: None    Neurological deficit at discharge: left facial droop, unsteady gait and mild dysarthria     [place] [facility]    Final Active Diagnoses:    Diagnosis Date Noted POA    PRINCIPAL PROBLEM:  Arterial ischemic stroke, vertebrobasilar, brainstem, acute [I63.219, I63.22] 05/20/2017 Yes    Hypertension [I10] 12/04/2013 Yes    Type 2 diabetes mellitus with complication, without long-term current use of insulin [E11.8] 12/04/2013 Yes    HLD (hyperlipidemia) [E78.5] 12/04/2013 Yes    Cerebral aneurysm, nonruptured [I67.1] 12/16/2013 Yes    Non morbid obesity [E66.9] 05/21/2017 Yes      Problems Resolved During this Admission:    Diagnosis Date Noted Date Resolved POA     * Arterial ischemic stroke, vertebrobasilar, brainstem, acute    80 y/o PMH HTN, DM2, HLD, remote right carona radiata infarct w/o residual deficits who presents with chief complaint of falls. Admitted to [place] with acute bilateral cerebellar infarcts. No tPA given 2/2 out of window and no thrombectomy recommended by Telemedicine at the time given no large vessel dz. Discharged home with possible worsening of balance issues though may be due to previous CVA. Not a candidate for tPA given recent CVA    Antithrombotics: ASA 325mg, plavix  Statins: Lipitor 40mg qd  PT/OT rec HH which as same as [place]  MRI Brain with punctate acute infarcts of posterior amy and punctate foci in anterior medulla, will compare to OSH MRI reporting cerebellar infarcts. 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 which was seen at OSH MRA  TSH 1.6, LDL 68, A1c 7.3        Hypertension    Stroke risk factor  -Restarted amlodipine 10mg  -Restarted coreg 25mg BID  -Restarted lisinopril 40mg qd  -Restarted chlorthalidone 100mg qd  -Restarted home spironolactone 25mg qd  -Holding home  hydralazine 50mg TID        Type 2 diabetes mellitus with complication, without long-term current use of insulin    Stroke risk factor  -A1c 7.3, A1c goal 7-8 given age  -Only on metformin 500mg po BID at home, resume at d/c  -LDSSI        HLD (hyperlipidemia)    Stroke risk factor  -Continue atorvastatin 40mg qd        Cerebral aneurysm, nonruptured    -3mm PCOM aneurysm from OSH imaging, also present on 2013 imaging (MRA)  -Needs regular monitoring as outpatient            Summary:     Admit Date: 5/20/2017 11:43 AM    Discharge Date and Time:  05/23/2017 11:34 AM    Attending Physician: Param Parra MD     Discharge Provider: Moshe Arthur IV, MD    History of Present Illness: 80 y/o PMH HTN, DM2, HLD, remote right carona radiata infarct w/o residual deficits who presents with chief complaint of falls. He reports acute onset blurry vision, imbalance on Tuesday 05/16 for with some left sided facial droop for which he presented to [facility]. There he was found to have intact strength and sensation but left sided facial drooping. MRI findings notable for extensive small vessel WM dz, multiple old deep WM lacunar infarcts LUCR[place]IA but no acute ischemic changes in the cortex. No bleeding. Did note several small cortical infarcts. An MRA brain suggested distal vertebral artery high-grade stenosis. He was seen by PT/OT who recommended outpatient PT and provided a walker. His family reports since he got home he has had worsening of his balance problems but no other neurologic findings. Has had some falls as well. No new vision changes, focal weakness, slurring speech, or worsening of his facial droop      Hospital Course (synopsis of major diagnoses, care, treatment, and services provided during the course of the hospital stay): Found to have left facial droop and balance disturbance at admission. MRI of brain showed pontine and medullary punctate infarcts. Also noted by staff on review of MRI of from  [facility] but unable to see any cerebellar infarcts per OSH report. 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 He did not get tPA or thrombectomy. Evaluated by PT/OT who felt disposition with home health was appropriate. After discussion with patient's family, decided to return tot home health PT/OT. Discussion about NH placement if felt unable to care for at home which patient and family adamantly refused. Reported some blurry vision he had present prior to his stroke. No visual field cuts noted and able to correctly identify number of fingers on exam. No etiology found from stroke perspective. He stated he had an ophthalmologist he would prefer to follow-up with.     NIH Stroke Scale:  Interval: 7 days or at discharge (whichever comes first)  Level of Consciousness: 0 - alert  LOC Questions: 0 - answers both correctly  LOC Commands: 0 - performs both correctly  Best Gaze: 0 - normal  Visual: 0 - no visual loss  Facial Palsy: 1 - minor  Motor Left Arm: 0 - no drift  Motor Right Arm: 0 - no drift  Motor Left Le - no drift  Motor Right Le - no drift  Limb Ataxia: 0 - absent  Sensory: 0 - normal  Best Language: 1 - mild to moderate aphasia  Dysarthria: 0 - normal articulation  Extinction and Inattention: 0 - no neglect  NIH Stroke Scale Total: 2  Modified [place] Scale:   Timeline: At discharge  Modified [place] Score: 1 - no significant disability            Recommendations:     Post-discharge complication risks: Falls    Stroke Education given to: patient and family    Follow-up in Stroke Clinic in approximately 14 days    Discharge Plan:  Antithrombotics: Aspirin 325mg, Plavix  Statin: Atorvastatin 40mg  Aggresive risk factor modification:  Hypertension, Diabetes, High Cholesterol and Diet    Follow Up:  Follow-up Information     [facility] VASCULAR NEUROLOGY In 2 weeks.    Specialty:  Vascular Neurology  Why:  hospital followup for vascular neurology  Contact information:  [id number]  [facility] [id number]  [phone]               Patient Instructions:     Ambulatory Referral to Vascular Neurology   Referral Priority: Routine Referral Type: Consultation   Referral Reason: Specialty Services Required    Requested Specialty: Vascular Neurology    Number of Visits Requested: 1      Diet general   Order Specific Question Answer Comments   Additional restrictions: Cardiac (Low Na/Chol)    Additional restrictions: Diabetic 2000      Activity as tolerated     Call MD for:  increased confusion or weakness     Call MD for:  persistent dizziness, light-headedness, or visual disturbances     Call MD for:  worsening rash     Call MD for:  severe persistent headache     Call MD for:  difficulty breathing or increased cough     Call MD for:  redness, tenderness, or signs of infection (pain, swelling, redness, odor or green/yellow discharge around incision site)     Call MD for:  severe uncontrolled pain     Call MD for:  persistent nausea and vomiting or diarrhea     Call MD for:  temperature >100.4       Medications:  Reconciled Home Medications:   Current Discharge Medication List      CONTINUE these medications which have CHANGED    Details   chlorthalidone (HYGROTEN) 50 MG Tab Take 2 tablets (100 mg total) by mouth once daily.  Qty: 180 tablet, Refills: 0      lisinopril (PRINIVIL,ZESTRIL) 40 MG tablet Take 1 tablet (40 mg total) by mouth once daily.  Qty: 90 tablet, Refills: 0         CONTINUE these medications which have NOT CHANGED    Details   amlodipine (NORVASC) 10 MG tablet Take 10 mg by mouth once daily.      aspirin (ECOTRIN) 325 MG EC tablet Take 325 mg by mouth once daily.      atorvastatin (LIPITOR) 40 MG tablet Take 1 tablet (40 mg total) by mouth every evening.  Qty: 30 tablet, Refills: 2      carvedilol (COREG) 25 MG tablet Take 25 mg by mouth 2 (two) times daily with meals.      clopidogrel (PLAVIX) 75 mg tablet Take 1 tablet (75 mg total) by mouth once daily.  Qty:  30 tablet, Refills: 2      metformin (GLUCOPHAGE) 500 MG tablet Take 1 tablet (500 mg total) by mouth 2 (two) times daily with meals.  Qty: 60 tablet, Refills: 2      multivitamin capsule Take 1 capsule by mouth once daily.      oxybutynin (DITROPAN) 5 MG Tab Take 5 mg by mouth 2 (two) times daily.      spironolactone (ALDACTONE) 25 MG tablet Take 25 mg by mouth once daily.      albuterol 90 mcg/actuation inhaler Inhale 2 puffs into the lungs every 6 (six) hours as needed for Wheezing or Shortness of Breath.  Qty: 8.5 g, Refills: 2      donepezil (ARICEPT) 10 MG tablet Take 10 mg by mouth 2 (two) times daily.      meclizine (ANTIVERT) 12.5 mg tablet Take 1 tablet (12.5 mg total) by mouth 3 (three) times daily as needed for Dizziness.  Qty: 90 tablet, Refills: 2      omega-3 fatty acids-vitamin E (FISH OIL) 1,000 mg Cap Take 1,000 mg by mouth 2 (two) times daily.         STOP taking these medications       hydrALAZINE (APRESOLINE) 50 MG tablet Comments:   Reason for Stopping:               Moshe Arthur IV, MD  Comprehensive Stroke Center  Department of Vascular Neurology   [facility]

## 2017-05-23 NOTE — PLAN OF CARE
Problem: Patient Care Overview  Goal: Plan of Care Review  Outcome: Ongoing (interventions implemented as appropriate)  Pt is AAOX4. He denies Chest pain, SOB or nausea. Fall risk reviewed with pt. No falls, trauma or injury noted. Confused X1(see previous note) Bed alarm set; camera on place. VSS. Plan of care reviewed with patient. No further questions at this time. No significant events. Will continue to monitor.

## 2017-05-23 NOTE — SUBJECTIVE & OBJECTIVE
Neurologic Chief Complaint: falls    Subjective:     Interval History: Patient is seen for follow-up neurological assessment and treatment recommendations: No events. This AM Mr. Sellers reports he is doing well. He states he watched a movie yesterday and it brought his memory back. Reports he is ready to go home.     Medications: I have not reviewed the current medication administration record.    Prescriptions Prior to Admission   Medication Sig Dispense Refill Last Dose    amlodipine (NORVASC) 10 MG tablet Take 10 mg by mouth once daily.   5/19/2017    aspirin (ECOTRIN) 325 MG EC tablet Take 325 mg by mouth once daily.   5/20/2017    atorvastatin (LIPITOR) 40 MG tablet Take 1 tablet (40 mg total) by mouth every evening. 30 tablet 2 5/19/2017    carvedilol (COREG) 25 MG tablet Take 25 mg by mouth 2 (two) times daily with meals.   5/20/2017    clopidogrel (PLAVIX) 75 mg tablet Take 1 tablet (75 mg total) by mouth once daily. 30 tablet 2 5/19/2017    hydrALAZINE (APRESOLINE) 50 MG tablet Take 50 mg by mouth 3 (three) times daily.   5/20/2017    lisinopril (PRINIVIL,ZESTRIL) 40 MG tablet Take 80 mg by mouth once daily.   5/20/2017    metformin (GLUCOPHAGE) 500 MG tablet Take 1 tablet (500 mg total) by mouth 2 (two) times daily with meals. 60 tablet 2 5/20/2017    multivitamin capsule Take 1 capsule by mouth once daily.   5/19/2017    oxybutynin (DITROPAN) 5 MG Tab Take 5 mg by mouth 2 (two) times daily.   5/20/2017    spironolactone (ALDACTONE) 25 MG tablet Take 25 mg by mouth once daily.       albuterol 90 mcg/actuation inhaler Inhale 2 puffs into the lungs every 6 (six) hours as needed for Wheezing or Shortness of Breath. 8.5 g 2 Taking    chlorthalidone (HYGROTEN) 50 MG Tab Take 100 mg by mouth once daily.   Taking    donepezil (ARICEPT) 10 MG tablet Take 10 mg by mouth 2 (two) times daily.   Taking    meclizine (ANTIVERT) 12.5 mg tablet Take 1 tablet (12.5 mg total) by mouth 3 (three) times daily  as needed for Dizziness. 90 tablet 2 Taking    omega-3 fatty acids-vitamin E (FISH OIL) 1,000 mg Cap Take 1,000 mg by mouth 2 (two) times daily.   Taking       Review of Systems   Constitutional: Negative for chills and fever.   HENT: Negative for congestion and rhinorrhea.    Eyes: Negative for discharge and redness.   Respiratory: Negative for cough and shortness of breath.    Cardiovascular: Negative for chest pain and palpitations.   Gastrointestinal: Negative for nausea and vomiting.   Genitourinary: Negative for dysuria and hematuria.   Musculoskeletal: Negative for myalgias and neck pain.   Skin: Negative for rash and wound.   Neurological: Positive for dizziness, facial asymmetry and weakness. Negative for speech difficulty, light-headedness, numbness and headaches.   Psychiatric/Behavioral: Positive for confusion and dysphoric mood. Negative for agitation.     Objective:     Vital Signs (Most Recent):  Temp: 96.5 °F (35.8 °C) (05/23/17 0731)  Pulse: 65 (05/23/17 0731)  Resp: 18 (05/23/17 0731)  BP: (!) 145/71 (05/23/17 0731)  SpO2: 98 % (05/23/17 0731)    Vital Signs Range (Last 24H):  Temp:  [96.5 °F (35.8 °C)-98.2 °F (36.8 °C)]   Pulse:  [56-71]   Resp:  [16-20]   BP: (143-187)/(65-87)   SpO2:  [96 %-99 %]     Physical Exam   Constitutional: He is oriented to person, place, and time. He appears well-developed and well-nourished. No distress.   HENT:   Head: Normocephalic and atraumatic.   Eyes: EOM are normal. Pupils are equal, round, and reactive to light.   Neck: Normal range of motion. Neck supple.   Cardiovascular: Normal rate and regular rhythm.    Pulmonary/Chest: Effort normal. No respiratory distress.   Abdominal: Soft. He exhibits no distension.   Musculoskeletal: He exhibits no edema or tenderness.   Neurological: He is alert and oriented to person, place, and time. A cranial nerve deficit is present. Coordination normal. GCS eye subscore is 4 - spontaneous. GCS verbal subscore is 5 - oriented.  GCS motor subscore is 6 - obeys commands.   Skin: Skin is warm and dry. He is not diaphoretic.   Psychiatric: He has a normal mood and affect. His behavior is normal. Thought content normal.   Oriented x3       Neurological Exam:   LOC: alert and follows requests  Language: No aphasia  Speech: No dysarthria  Orientation: Person, Place, Time  Memory: Recent memory intact, Remote memory intact, Age correct, Month correct  Visual Fields (CN II): Full  EOM (CN III, IV, VI): Full/intact  Pupils (CN III, IV, VI): PERRL  Facial Sensation (CN V): Symmetric   Facial Movement (CN VII): upper weakness left upper and left lower and lower weakness left upper and left lower  Tongue (CN XII): to midline  Motor*: Arm Left:  Normal (5/5), Leg Left:   Normal (5/5), Arm Right:   Normal (5/5), Leg Right:   Normal (5/5)  Cerebellar*: Normal limb  Sensation: intact to light touch, pain  Tone: Arm-Left: normal; Leg-Left: normal; Arm-Right: normal; Leg-Right: normal    Stroke Team Times:   Last Known Normal Date and Time : 2017  Symptom Onset Date and Time:2017    NIH Stroke Scale:  Interval: baseline (upon arrival/admit)  Level of Consciousness: 0 - alert  LOC Questions: 0 - answers both correctly  LOC Commands: 0 - performs both correctly  Best Gaze: 0 - normal  Visual: 0 - no visual loss  Facial Palsy: 2 - partial  Motor Left Arm: 0 - no drift  Motor Right Arm: 0 - no drift  Motor Left Le - no drift  Motor Right Le - no drift  Limb Ataxia: 0 - absent  Sensory: 0 - normal  Best Language: 0 - no aphasia  Dysarthria: 0 - normal articulation  Extinction and Inattention: 0 - no neglect  NIH Stroke Scale Total: 2  Catherine Coma Scale:  Best Eye Response: 4 - spontaneous  Best Motor Response: 6 - obeys commands  Best Verbal Response: 5 - oriented  Catherine Coma Scale Total: 15  Modified Chaplin Scale:   Timeline: Prior to symptoms onset  Modified Chaplin Score: 2 - slight disability        Laboratory:  CMP:   No results for  input(s): GLUCOSE, CALCIUM, ALBUMIN, PROT, NA, K, CO2, CL, BUN, CREATININE, ALKPHOS, ALT, AST, BILITOT in the last 24 hours.  CBC:     Recent Labs  Lab 05/22/17  0528   WBC 6.87   RBC 5.22   HGB 15.1   HCT 43.9      MCV 84   MCH 28.9   MCHC 34.4       Diagnostic Results:  Brain Imaging: MRI Head. Punctate acute infarcts within the posterior amy with possible additional punctate focus within the anterior medulla.  No intracranial hemorrhage. Suspected high-grade stenosis versus occlusion of the proximal aspect of the V4 segment of the distal right vertebral artery.

## 2017-05-23 NOTE — NURSING
Discharge instructions reviewed with pt and pt grand daughter, understanding stated. Pt has all personal belongings. Pt to be discharged via wheelchair by transport. No distress or discomfort noted.

## 2017-05-23 NOTE — ASSESSMENT & PLAN NOTE
82 y/o PMH HTN, DM2, HLD, remote right carona radiata infarct w/o residual deficits who presents with chief complaint of falls. Admitted to [place] with acute bilateral cerebellar infarcts. No tPA given 2/2 out of window and no thrombectomy recommended by Telemedicine at the time given no large vessel dz. Discharged home with possible worsening of balance issues though may be due to previous CVA. Not a candidate for tPA given recent CVA    Antithrombotics: ASA 325mg, plavix  Statins: Lipitor 40mg qd  PT/OT rec HH which as same as [place]  MRI Brain with punctate acute infarcts of posterior amy and punctate foci in anterior medulla, will compare to OSH MRI reporting cerebellar infarcts. 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

## 2017-05-23 NOTE — ASSESSMENT & PLAN NOTE
Stroke risk factor  -Restarted amlodipine 10mg  -Restarted coreg 25mg BID  -Restarted lisinopril 40mg qd  -Restarted chlorthalidone 100mg qd  -Restarted home spironolactone 25mg qd  -Holding home hydralazine 50mg TID

## 2017-05-23 NOTE — ASSESSMENT & PLAN NOTE
80 y/o PMH HTN, DM2, HLD, remote right carona radiata infarct w/o residual deficits who presents with chief complaint of falls. Admitted to [place] with acute bilateral cerebellar infarcts. No tPA given 2/2 out of window and no thrombectomy recommended by Telemedicine at the time given no large vessel dz. Discharged home with possible worsening of balance issues though may be due to previous CVA. Not a candidate for tPA given recent CVA    Antithrombotics: ASA 325mg, plavix  Statins: Lipitor 40mg qd  PT/OT rec HH which as same as [place]  MRI Brain with punctate acute infarcts of posterior amy and punctate foci in anterior medulla, will compare to OSH MRI reporting cerebellar infarcts. 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

## 2017-05-23 NOTE — PROGRESS NOTES
[facility]  Vascular Neurology  Comprehensive Stroke Center  Progress Note      Assessment/Plan:     Arterial ischemic stroke, vertebrobasilar, brainstem, acute    82 y/o PMH HTN, DM2, HLD, remote right carona radiata infarct w/o residual deficits who presents with chief complaint of falls. Admitted to [place] with acute bilateral cerebellar infarcts. No tPA given 2/2 out of window and no thrombectomy recommended by Telemedicine at the time given no large vessel dz. Discharged home with possible worsening of balance issues though may be due to previous CVA. Not a candidate for tPA given recent CVA    Antithrombotics: ASA 325mg, plavix  Statins: Lipitor 40mg qd  PT/OT rec HH which as same as [place]  MRI Brain with punctate acute infarcts of posterior amy and punctate foci in anterior medulla, will compare to OSH MRI reporting cerebellar infarcts. 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 which was seen at [facility]  TSH 1.6, LDL 68, A1c 7.3        Hypertension    Stroke risk factor  -Restarted amlodipine 10mg  -Restarted coreg 25mg BID  -Restarted lisinopril 40mg qd  -Started chlorthalidone 100mg qd  -Holding home spironolactone 25mg qd  -Holding home hydralazine 50mg TID  -Hydralazine IV PRN for SBP > 180        Type 2 diabetes mellitus with complication, without long-term current use of insulin    Stroke risk factor  -A1c 7.3, A1c goal 7-8 given age  -Only on metformin 500mg po BID at home  -LDSSI        CAD S/P percutaneous coronary angioplasty    -Restart aspirin/plavix if no bleed or new CVA        HLD (hyperlipidemia)    Stroke risk factor  -Continue atorvastatin 40mg qd        Cerebral aneurysm, nonruptured    -3mm PCOM aneurysm from OSH imaging, also present on 2013 imaging ([facility])              Neurologic Chief Complaint: falls    Subjective:     Interval History: Patient is seen for follow-up neurological assessment and treatment recommendations: No events. This AM Mr. Sellers  reports he is doing well. He states he watched a movie yesterday and it brought his memory back. Reports he is ready to go home.     Medications: I have not reviewed the current medication administration record.    Prescriptions Prior to Admission   Medication Sig Dispense Refill Last Dose    amlodipine (NORVASC) 10 MG tablet Take 10 mg by mouth once daily.   5/19/2017    aspirin (ECOTRIN) 325 MG EC tablet Take 325 mg by mouth once daily.   5/20/2017    atorvastatin (LIPITOR) 40 MG tablet Take 1 tablet (40 mg total) by mouth every evening. 30 tablet 2 5/19/2017    carvedilol (COREG) 25 MG tablet Take 25 mg by mouth 2 (two) times daily with meals.   5/20/2017    clopidogrel (PLAVIX) 75 mg tablet Take 1 tablet (75 mg total) by mouth once daily. 30 tablet 2 5/19/2017    hydrALAZINE (APRESOLINE) 50 MG tablet Take 50 mg by mouth 3 (three) times daily.   5/20/2017    lisinopril (PRINIVIL,ZESTRIL) 40 MG tablet Take 80 mg by mouth once daily.   5/20/2017    metformin (GLUCOPHAGE) 500 MG tablet Take 1 tablet (500 mg total) by mouth 2 (two) times daily with meals. 60 tablet 2 5/20/2017    multivitamin capsule Take 1 capsule by mouth once daily.   5/19/2017    oxybutynin (DITROPAN) 5 MG Tab Take 5 mg by mouth 2 (two) times daily.   5/20/2017    spironolactone (ALDACTONE) 25 MG tablet Take 25 mg by mouth once daily.       albuterol 90 mcg/actuation inhaler Inhale 2 puffs into the lungs every 6 (six) hours as needed for Wheezing or Shortness of Breath. 8.5 g 2 Taking    chlorthalidone (HYGROTEN) 50 MG Tab Take 100 mg by mouth once daily.   Taking    donepezil (ARICEPT) 10 MG tablet Take 10 mg by mouth 2 (two) times daily.   Taking    meclizine (ANTIVERT) 12.5 mg tablet Take 1 tablet (12.5 mg total) by mouth 3 (three) times daily as needed for Dizziness. 90 tablet 2 Taking    omega-3 fatty acids-vitamin E (FISH OIL) 1,000 mg Cap Take 1,000 mg by mouth 2 (two) times daily.   Taking       Review of Systems    Constitutional: Negative for chills and fever.   HENT: Negative for congestion and rhinorrhea.    Eyes: Negative for discharge and redness.   Respiratory: Negative for cough and shortness of breath.    Cardiovascular: Negative for chest pain and palpitations.   Gastrointestinal: Negative for nausea and vomiting.   Genitourinary: Negative for dysuria and hematuria.   Musculoskeletal: Negative for myalgias and neck pain.   Skin: Negative for rash and wound.   Neurological: Positive for dizziness, facial asymmetry and weakness. Negative for speech difficulty, light-headedness, numbness and headaches.   Psychiatric/Behavioral: Positive for confusion and dysphoric mood. Negative for agitation.     Objective:     Vital Signs (Most Recent):  Temp: 96.5 °F (35.8 °C) (05/23/17 0731)  Pulse: 65 (05/23/17 0731)  Resp: 18 (05/23/17 0731)  BP: (!) 145/71 (05/23/17 0731)  SpO2: 98 % (05/23/17 0731)    Vital Signs Range (Last 24H):  Temp:  [96.5 °F (35.8 °C)-98.2 °F (36.8 °C)]   Pulse:  [56-71]   Resp:  [16-20]   BP: (143-187)/(65-87)   SpO2:  [96 %-99 %]     Physical Exam   Constitutional: He is oriented to person, place, and time. He appears well-developed and well-nourished. No distress.   HENT:   Head: Normocephalic and atraumatic.   Eyes: EOM are normal. Pupils are equal, round, and reactive to light.   Neck: Normal range of motion. Neck supple.   Cardiovascular: Normal rate and regular rhythm.    Pulmonary/Chest: Effort normal. No respiratory distress.   Abdominal: Soft. He exhibits no distension.   Musculoskeletal: He exhibits no edema or tenderness.   Neurological: He is alert and oriented to person, place, and time. A cranial nerve deficit is present. Coordination normal. GCS eye subscore is 4 - spontaneous. GCS verbal subscore is 5 - oriented. GCS motor subscore is 6 - obeys commands.   Skin: Skin is warm and dry. He is not diaphoretic.   Psychiatric: He has a normal mood and affect. His behavior is normal. Thought  content normal.   Oriented x3       Neurological Exam:   LOC: alert and follows requests  Language: No aphasia  Speech: No dysarthria  Orientation: Person, Place, Time  Memory: Recent memory intact, Remote memory intact, Age correct, Month correct  Visual Fields (CN II): Full  EOM (CN III, IV, VI): Full/intact  Pupils (CN III, IV, VI): PERRL  Facial Sensation (CN V): Symmetric   Facial Movement (CN VII): upper weakness left upper and left lower and lower weakness left upper and left lower  Tongue (CN XII): to midline  Motor*: Arm Left:  Normal (5/5), Leg Left:   Normal (5/5), Arm Right:   Normal (5/5), Leg Right:   Normal (5/5)  Cerebellar*: Normal limb  Sensation: intact to light touch, pain  Tone: Arm-Left: normal; Leg-Left: normal; Arm-Right: normal; Leg-Right: normal    Stroke Team Times:   Last Known Normal Date and Time : 2017  Symptom Onset Date and Time:2017    NIH Stroke Scale:  Interval: baseline (upon arrival/admit)  Level of Consciousness: 0 - alert  LOC Questions: 0 - answers both correctly  LOC Commands: 0 - performs both correctly  Best Gaze: 0 - normal  Visual: 0 - no visual loss  Facial Palsy: 2 - partial  Motor Left Arm: 0 - no drift  Motor Right Arm: 0 - no drift  Motor Left Le - no drift  Motor Right Le - no drift  Limb Ataxia: 0 - absent  Sensory: 0 - normal  Best Language: 0 - no aphasia  Dysarthria: 0 - normal articulation  Extinction and Inattention: 0 - no neglect  NIH Stroke Scale Total: 2  Wendell Coma Scale:  Best Eye Response: 4 - spontaneous  Best Motor Response: 6 - obeys commands  Best Verbal Response: 5 - oriented  Wendell Coma Scale Total: 15  Modified Tacho Scale:   Timeline: Prior to symptoms onset  Modified Tacho Score: 2 - slight disability        Laboratory:  CMP:   No results for input(s): GLUCOSE, CALCIUM, ALBUMIN, PROT, NA, K, CO2, CL, BUN, CREATININE, ALKPHOS, ALT, AST, BILITOT in the last 24 hours.  CBC:     Recent Labs  Lab 17  0528   WBC 6.87    RBC 5.22   HGB 15.1   HCT 43.9      MCV 84   MCH 28.9   MCHC 34.4       Diagnostic Results:  Brain Imaging: MRI Head. Punctate acute infarcts within the posterior amy with possible additional punctate focus within the anterior medulla.  No intracranial hemorrhage. Suspected high-grade stenosis versus occlusion of the proximal aspect of the V4 segment of the distal right vertebral artery.        Moshe Arthur IV, MD  [facility]  Department of Vascular Neurology   [facility]

## 2017-05-23 NOTE — ASSESSMENT & PLAN NOTE
-3mm PCOM aneurysm from OSH imaging, also present on 2013 imaging ([facility])  -Needs regular monitoring as outpatient

## 2017-05-23 NOTE — PROGRESS NOTES
"Pt was found walking on the hallway.He was confused. He stated "I am going to [place]." He was disoriented to place and time. Brought pt back to the bed and reoriented the time and place. Pt verbalized understanding. Bed alarm set and camera is on. It was happened previous night too. Will monitor the patient.  "

## 2017-05-23 NOTE — PLAN OF CARE
[facility]    HOME HEALTH ORDERS  FACE TO FACE ENCOUNTER    Patient Name: Nick Sellers  YOB: 1935    PCP: Milagros Prakash MD   PCP Address: [address] / [place] [address]  PCP Phone Number: [phone]  PCP Fax: [phone]    Encounter Date: 05/23/2017    Admit to Home Health    Diagnoses:  Active Hospital Problems    Diagnosis  POA    *Arterial ischemic stroke, vertebrobasilar, brainstem, acute [I63.219, I63.22]  Yes     Priority: 1 - High    Hypertension [I10]  Yes     Priority: 2     CAD S/P percutaneous coronary angioplasty [I25.10, Z98.61]  Not Applicable     Priority: 3     Type 2 diabetes mellitus with complication, without long-term current use of insulin [E11.8]  Yes     Priority: 3     HLD (hyperlipidemia) [E78.5]  Yes     Priority: 3     Cerebral aneurysm, nonruptured [I67.1]  Yes     Priority: 4     Non morbid obesity [E66.9]  Yes      Resolved Hospital Problems    Diagnosis Date Resolved POA   No resolved problems to display.       No future appointments.  Follow-up Information     [facility] VASCULAR NEUROLOGY In 2 weeks.    Specialty:  Vascular Neurology  Why:  hospital followup for vascular neurology  Contact information:  [address]  [facility] [address] [phone]                   I have seen and examined this patient face to face today. My clinical findings that support the need for the home health skilled services and home bound status are the following:  Weakness/numbness causing balance and gait disturbance due to Stroke making it taxing to leave home.    Allergies:  Review of patient's allergies indicates:   Allergen Reactions    Pcn [penicillins]      Patient states he has been paralyzed when given PCN in the past        Diet: cardiac diet and diabetic diet: 2000 calorie    Activities: activity as tolerated    Nursing:   SN to complete comprehensive assessment including routine vital signs. Instruct on disease process and  s/s of complications to report to MD. Review/verify medication list sent home with the patient at time of discharge  and instruct patient/caregiver as needed. Frequency may be adjusted depending on start of care date.    Notify MD if SBP > 160 or < 90; DBP > 90 or < 50; HR > 120 or < 50; Temp > 101; Other:         CONSULTS:    Physical Therapy to evaluate and treat. Evaluate for home safety and equipment needs; Establish/upgrade home exercise program. Perform / instruct on therapeutic exercises, gait training, transfer training, and Range of Motion.  Occupational Therapy to evaluate and treat. Evaluate home environment for safety and equipment needs. Perform/Instruct on transfers, ADL training, ROM, and therapeutic exercises.   to evaluate for community resources/long-range planning.    MISCELLANEOUS CARE:    WOUND CARE ORDERS    Medications: Review discharge medications with patient and family and provide education.      Current Discharge Medication List      CONTINUE these medications which have CHANGED    Details   chlorthalidone (HYGROTEN) 50 MG Tab Take 2 tablets (100 mg total) by mouth once daily.  Qty: 180 tablet, Refills: 0      lisinopril (PRINIVIL,ZESTRIL) 40 MG tablet Take 1 tablet (40 mg total) by mouth once daily.  Qty: 90 tablet, Refills: 0         CONTINUE these medications which have NOT CHANGED    Details   amlodipine (NORVASC) 10 MG tablet Take 10 mg by mouth once daily.      aspirin (ECOTRIN) 325 MG EC tablet Take 325 mg by mouth once daily.      atorvastatin (LIPITOR) 40 MG tablet Take 1 tablet (40 mg total) by mouth every evening.  Qty: 30 tablet, Refills: 2      carvedilol (COREG) 25 MG tablet Take 25 mg by mouth 2 (two) times daily with meals.      clopidogrel (PLAVIX) 75 mg tablet Take 1 tablet (75 mg total) by mouth once daily.  Qty: 30 tablet, Refills: 2      metformin (GLUCOPHAGE) 500 MG tablet Take 1 tablet (500 mg total) by mouth 2 (two) times daily with meals.  Qty: 60  tablet, Refills: 2      multivitamin capsule Take 1 capsule by mouth once daily.      oxybutynin (DITROPAN) 5 MG Tab Take 5 mg by mouth 2 (two) times daily.      spironolactone (ALDACTONE) 25 MG tablet Take 25 mg by mouth once daily.      albuterol 90 mcg/actuation inhaler Inhale 2 puffs into the lungs every 6 (six) hours as needed for Wheezing or Shortness of Breath.  Qty: 8.5 g, Refills: 2      donepezil (ARICEPT) 10 MG tablet Take 10 mg by mouth 2 (two) times daily.      meclizine (ANTIVERT) 12.5 mg tablet Take 1 tablet (12.5 mg total) by mouth 3 (three) times daily as needed for Dizziness.  Qty: 90 tablet, Refills: 2      omega-3 fatty acids-vitamin E (FISH OIL) 1,000 mg Cap Take 1,000 mg by mouth 2 (two) times daily.         STOP taking these medications       hydrALAZINE (APRESOLINE) 50 MG tablet Comments:   Reason for Stopping:               I certify that this patient is confined to his home and needs intermittent skilled nursing care, physical therapy and occupational therapy.

## 2017-05-23 NOTE — PT/OT/SLP PROGRESS
"Occupational Therapy  Treatment    Nick Sellers   MRN: [id number]   Admitting Diagnosis: Arterial ischemic stroke, vertebrobasilar, brainstem, acute    OT Date of Treatment: 17   OT Start Time: 1148  OT Stop Time: 1156  OT Total Time (min): 8 min    Billable Minutes:  Therapeutic Activity 8    General Precautions: Standard, aspiration, fall, diabetic  Orthopedic Precautions: N/A  Braces: N/A    Do you have any cultural, spiritual, Jew conflicts, given your current situation?: Confucianist    Subjective:  Communicated with RN prior to session.  Pt agreeable to therapy.   "I watched a movie and when it was over, all my memory came back to me." "I didn't know my name before that movie."  Pt oriented     Pain Ratin/10     Pain Rating Post-Intervention: 0/10    Objective:  Patient found with: telemetry, peripheral IV     Functional Mobility:  Pt found up in chair     Activities of Daily Living:  Feeding Level of Assistance: Set-up Assistance  UE Dressing Level of Assistance: Set-up Assistance (pt c/o R shoulder pain limiting performance.  Educated on one handed dressing technique)  LE Dressing Level of Assistance: Set-up Assistance    Therapeutic Activities and Exercises:  Pt performed 2x10 bicep curls 4# dowel and 2x10 chest press 4# dowel. He did c/o R shoulder pain with shoulder flexion.   Discussed ADL performance and perceived impairments and difficulties he might expect to have at home.  Pt stated the PT dressed him today but he is capable and demonstrated performance with LB dressing. Reviewed one handed dressing technique to reduce shoulder pain and promote independence. He declined demonstrating but was able to recall sequence after a few minutes.  Reviewed FAST acronym for recognizing s/s of stroke.  Pt able to teachback.      Short session on this date as pt eager to d/c, food tray arrived and he had company as well.   Pt had no further concerns or needs.    AM-PAC 6 CLICK ADL   How much help from " another person does this patient currently need?   1 = Unable, Total/Dependent Assistance  2 = A lot, Maximum/Moderate Assistance  3 = A little, Minimum/Contact Guard/Supervision  4 = None, Modified [place]/Independent    Putting on and taking off regular lower body clothing? : 3  Bathing (including washing, rinsing, drying)?: 3  Toileting, which includes using toilet, bedpan, or urinal? : 3  Putting on and taking off regular upper body clothing?: 3  Taking care of personal grooming such as brushing teeth?: 3  Eating meals?: 3  Total Score: 18     AM-PAC Raw Score CMS G-Code Modifier Level of Impairment Assistance   6 % Total / Unable   7 - 9 CM 80 - 100% Maximal Assist   10 - 14 CL 60 - 80% Moderate Assist   15 - 19 CK 40 - 60% Moderate Assist   20 - 22 CJ 20 - 40% Minimal Assist   23 CI 1-20% SBA / CGA   24 CH 0% Independent/ Mod I     Patient left up in chair with all lines intact and call button in reach    ASSESSMENT:  Nick Sellers is a 81 y.o. male with a medical diagnosis of Arterial ischemic stroke, vertebrobasilar, brainstem, acute and presents with decline in ADLs and functional mobility. Pt would benefit from skilled OT services in order to maximize independence with ADLs and facilitate safe discharge.    Rehab identified problem list/impairments: Rehab identified problem list/impairments: weakness, impaired endurance, impaired sensation, impaired self care skills, impaired functional mobilty, impaired balance, decreased coordination, gait instability, decreased upper extremity function, impaired cognition, visual deficits, decreased lower extremity function, impaired fine motor, impaired coordination, decreased safety awareness    Rehab potential is good.    Activity tolerance: Good    Discharge recommendations:   outpatient OT    Barriers to discharge: Barriers to Discharge: Inaccessible home environment, Decreased caregiver support    Equipment recommendations: walker, rolling, bedside  jazmineode (pt says he has a shower chair)     GOALS:    Occupational Therapy Goals        Problem: Occupational Therapy Goal    Goal Priority Disciplines Outcome Interventions   Occupational Therapy Goal     OT, PT/OT Ongoing (interventions implemented as appropriate)    Description:  Goals to be met by: 5/28/17     Patient will increase functional independence with ADLs by performing:    UE Dressing with Modified [place].  LE Dressing with Modified [place].  Grooming while standing with Modified [place].  Toileting from toilet with Modified [place] for hygiene and clothing management.   Rolling to Bilateral with Modified [place].   Supine to sit with Modified [place].  Stand pivot transfers with Modified [place].                      Plan:  Patient to be seen 6 x/week to address the above listed problems via self-care/home management, therapeutic activities, therapeutic exercises, neuromuscular re-education, cognitive retraining  Plan of Care expires: 06/20/17  Plan of Care reviewed with: patient         JORDAN Worthington  05/23/2017

## 2017-05-23 NOTE — ASSESSMENT & PLAN NOTE
Stroke risk factor  -A1c 7.3, A1c goal 7-8 given age  -Only on metformin 500mg po BID at home, resume at d[facility]c  [facility]

## 2017-05-23 NOTE — PT/OT/SLP PROGRESS
"Physical Therapy  Treatment / Discharge Summary    Nick Sellers   MRN: [id number]   Admitting Diagnosis: Arterial ischemic stroke, vertebrobasilar, brainstem, acute    PT Received On: 17  PT Start Time: 1117     PT Stop Time: 1138    PT Total Time (min): 21 min       Billable Minutes:  Gait [id number]    Treatment Type: Treatment    General Precautions: Standard, aspiration, fall  Orthopedic Precautions: N/A   Braces: N/A    Subjective:  Communicated with RN prior to session.  "They bought me all new clothes, can I put them on?" "I'm leaving today, I have to get back to my wife."    Pain Ratin/10  Pain Rating Post-Intervention: 0/10    Objective:   Patient found with: telemetry    Functional Mobility:  Pt found supine  Bed Mobility:      Supine <> Sit: From right sidelying: Supervision     Transfers:    Sit <> Stand: x 3 trials from EOB with Standby Assistance with No Assistive Device    Stand <> Sit: x 3 trials to EOB with Standby Assistance with No Assistive Device   Comments: Pt c/o feeling dizzy when standing, dissipated with time.    Gait:   Distance Ambulated: 150ft    Assistance level: Stand-by Assistance   Assistive Device used:  Rolling Walker   Gait Pattern: 3-point gait   Gait Deviation(s): decreased derrick, decreased step length, decreased stride length and decreased weight-shifting ability   Impairments due to: decreased LLE strength   Comments: Pt required vc's to slow pace and stay within RW, pt pushing RW too far ahead. Pt able to correct with vc's. Pt with increased L knee flexion throughout gait phases.    Therapeutic Activities and Exercises:  Seated in bedside chair: Pt performed BLE hip flexion, LAQs and ankle pumps 10 repetitions with facilitation for correct performance and sequencing. Pt required [place] for balance and postural control. Exercises performed to develop and maintain pt's  strength, endurance and ROM.      Education:  Patient provided with daily orientation and " "goals of this PT session. Patient agreed to participate in session.Patient aware of patient's deficits and therapy progression. They were educated to transfer with RN/PCT only; min A of 1 person. Patient educated on signs & symptoms of stroke, including the "FAST" acronym and risk factor modification. Pt able to demonstrate understanding of all education via the teachback method. Time provided for therapeutic counseling and discussion of health disposition. All questions answered to patient's satisfaction, within scope of PT practice; voiced no other concerns. White board updated in patient's room, RN notified of session.    AM-PAC 6 CLICK MOBILITY  How much help from another person does this patient currently need?   1 = Unable, Total/Dependent Assistance  2 = A lot, Maximum/Moderate Assistance  3 = A little, Minimum/Contact Guard/Supervision  4 = None, Modified [place]/Independent    Turning over in bed (including adjusting bedclothes, sheets and blankets)?: 4  Sitting down on and standing up from a chair with arms (e.g., wheelchair, bedside commode, etc.): 4  Moving from lying on back to sitting on the side of the bed?: 4  Moving to and from a bed to a chair (including a wheelchair)?: 4  Need to walk in hospital room?: 4  Climbing 3-5 steps with a railing?: 3  Total Score: 23    AM-PAC Raw Score CMS G-Code Modifier Level of Impairment Assistance   6 % Total / Unable   7 - 9 CM 80 - 100% Maximal Assist   10 - 14 CL 60 - 80% Moderate Assist   15 - 19 CK 40 - 60% Moderate Assist   20 - 22 CJ 20 - 40% Minimal Assist   23 CI 1-20% SBA / CGA   24 CH 0% Independent/ Mod I     Patient left up in chair with all lines intact, call button in reach and RN notified.    Assessment:  Nick Sellers is a 81 y.o. male with a medical diagnosis of Arterial ischemic stroke, vertebrobasilar, brainstem, acute. Patient is making progress towards goals, participating well in this session. Pt with noted improvement with " mobility, safety awareness and cognition. Mr. Sellers's deficits affect their ability to safely and independently participate in self-care tasks and functional mobility. Due to his physical therapy diagnosis of debility and deconditioning, they continue to benefit from acute PT services to address the following limitations: high fall risk, weakness, instability, the need for supervised instructions of exercise, and the decreased ability to perform functional activities which they were able to complete PTA. Education was provided to patient regarding importance of continued participation in therapy, patient's progress and discharge disposition. Pt would benefit from OPPT upon discharge to improve quality of life and focus on recovery of impairments.     Rehab identified problem list/impairments: Rehab identified problem list/impairments: impaired balance, gait instability    Rehab potential is good.    Activity tolerance: Good    Discharge recommendations: Discharge Facility/Level Of Care Needs: outpatient PT     Barriers to discharge: Barriers to Discharge: None    Equipment recommendations: Equipment Needed After Discharge: bedside commode, walker, rolling     GOALS:    Physical Therapy Goals        Problem: Physical Therapy Goal    Goal Priority Disciplines Outcome Goal Variances Interventions   Physical Therapy Goal     PT/OT, PT Ongoing (interventions implemented as appropriate)     Description:  Goals to be met by: 2017     Patient will increase functional independence with mobility by performin. Supine to sit with [place]  2. Sit to supine with [place]  3. Sit to stand transfer with [place]  4. Bed to chair transfer with [place] and No AD  5. Gait x200 feet with Supervision using rolling walker  6. Stand for x10 minutes with Stand-by Assistance while performing dynamic balance tasks  7. Lower extremity exercise program x10 reps with supervision to improve coordination and  strength                      PLAN:    Pt discharged from [facility] on this date.  Plan of Care reviewed with: patient     Kaya NIRAJ Sarkar PT, DPT  5/23/2017  Pager: [phone]

## 2017-05-23 NOTE — SUBJECTIVE & OBJECTIVE
NIH Stroke Scale:  Interval: 7 days or at discharge (whichever comes first)  Level of Consciousness: 0 - alert  LOC Questions: 0 - answers both correctly  LOC Commands: 0 - performs both correctly  Best Gaze: 0 - normal  Visual: 0 - no visual loss  Facial Palsy: 1 - minor  Motor Left Arm: 0 - no drift  Motor Right Arm: 0 - no drift  Motor Left Le - no drift  Motor Right Le - no drift  Limb Ataxia: 0 - absent  Sensory: 0 - normal  Best Language: 1 - mild to moderate aphasia  Dysarthria: 0 - normal articulation  Extinction and Inattention: 0 - no neglect  NIH Stroke Scale Total: 2  Modified [place] Scale:   Timeline: At discharge  Modified Baden Score: 1 - no significant disability

## 2017-05-24 ENCOUNTER — TELEPHONE (OUTPATIENT)
Dept: NEUROSURGERY | Facility: HOSPITAL | Age: 82
End: 2017-05-24

## 2017-05-24 ENCOUNTER — PATIENT OUTREACH (OUTPATIENT)
Dept: ADMINISTRATIVE | Facility: CLINIC | Age: 82
End: 2017-05-24
Payer: OTHER GOVERNMENT

## 2017-05-24 NOTE — PATIENT INSTRUCTIONS
Fall Prevention  Falls often occur due to slipping, tripping or losing your balance. Millions of people fall every year and injure themselves. Here are ways to reduce your risk of falling again.  · Think about your fall, was there anything that caused your fall that can be fixed, removed, or replaced?  · Make your home safe by keeping walkways clear of objects you may trip over.  · Use non-slip pads under rugs. Do not use area rugs or small throw rugs.  · Use non-slip mats in bathtubs and showers.  · Install handrails and lights on staircases.  · Do not walk in poorly lit areas.  · Do not stand on chairs or wobbly ladders.  · Use caution when reaching overhead or looking upward. This position can cause a loss of balance.  · Be sure your shoes fit properly, have non-slip bottoms and are in good condition.   · Wear shoes both inside and out. Avoid going barefoot or wearing slippers.  · Be cautious when going up and down stairs, curbs, and when walking on uneven sidewalks.  · If your balance is poor, consider using a cane or walker.  · If your fall was related to alcohol use, stop or limit alcohol intake.   · If your fall was related to use of sleeping medicines, talk to your doctor about this. You may need to reduce your dosage at bedtime if you awaken during the night to go to the bathroom.    · To reduce the need for nighttime bathroom trips:  ¨ Avoid drinking fluids for several hours before going to bed  ¨ Empty your bladder before going to bed  ¨ Men can keep a urinal at the bedside  · Stay as active as you can. Balance, flexibility, strength, and endurance all come from exercise. They all play a role in preventing falls. Ask your healthcare provider which types of activity are right for you.  · Get your vision checked on a regular basis.  · If you have pets, know where they are before you stand up or walk so you don't trip over them.  · Use night lights.  Date Last Reviewed: 11/5/2015  © 1060-5010 [employer]. [address] [place] [address], [place] [address]. All rights reserved. This information is not intended as a substitute for professional medical care. Always follow your healthcare professional's instructions.

## 2017-05-24 NOTE — TELEPHONE ENCOUNTER
Called number on file.  Spoke with patient.  Risk factors specific to patient for stroke discussed with teach back implemented.  Patient verbalized understanding of discharge instructions and medications.  Patient was asked about discharge appointments and follow up care.  No follow appointment scheduled thus far.  Message sent to [facility] on patient's behalf to schedule follow up appointment. Warning sings discussed with teach back discussion method implemented.  Notified to seek immediate medical help via 911 if new or worsening stroke symptoms occur.  Patient relayed no new questions or comments at this time.  Instructed to call [facility] with any further questions.

## 2017-06-06 ENCOUNTER — TELEPHONE (OUTPATIENT)
Dept: NEUROLOGY | Facility: CLINIC | Age: 82
End: 2017-06-06

## 2017-06-06 NOTE — TELEPHONE ENCOUNTER
----- Message from Vivienne Bond sent at 6/6/2017 12:20 PM CDT -----  This is a request for signature on home health orders. Please note the home health orders for this patient has been scanned into the MEDIA section of the patients chart under home health orders outside correspondence dated 6/6/2017. Please have Dr Parra sign orders and have orders faxed back to [facility] [phone] at as soon as possible to assist the home care agency to stay within state compliance. Please provide us confirmation that the signed orders have been faxed for our records. If you have any questions regarding home care orders, please call Raeann Mott or Tila Angel [phone].     Thank you!

## 2017-06-13 ENCOUNTER — OFFICE VISIT (OUTPATIENT)
Dept: NEUROLOGY | Facility: CLINIC | Age: 82
End: 2017-06-13
Payer: OTHER GOVERNMENT

## 2017-06-13 VITALS
DIASTOLIC BLOOD PRESSURE: 65 MMHG | SYSTOLIC BLOOD PRESSURE: 136 MMHG | HEIGHT: 71 IN | HEART RATE: 59 BPM | WEIGHT: 257.94 LBS | BODY MASS INDEX: 36.11 KG/M2

## 2017-06-13 DIAGNOSIS — I10 ESSENTIAL HYPERTENSION: ICD-10-CM

## 2017-06-13 DIAGNOSIS — I67.9 CEREBROVASCULAR SMALL VESSEL DISEASE: Chronic | ICD-10-CM

## 2017-06-13 DIAGNOSIS — E11.8 TYPE 2 DIABETES MELLITUS WITH COMPLICATION, WITHOUT LONG-TERM CURRENT USE OF INSULIN: ICD-10-CM

## 2017-06-13 DIAGNOSIS — E78.2 MIXED HYPERLIPIDEMIA: ICD-10-CM

## 2017-06-13 DIAGNOSIS — Z86.73 HISTORY OF ISCHEMIC VERTEBROBASILAR ARTERY BRAINSTEM STROKE: Primary | ICD-10-CM

## 2017-06-13 PROBLEM — I63.22: Status: RESOLVED | Noted: 2017-05-20 | Resolved: 2017-06-13

## 2017-06-13 PROBLEM — I63.219: Status: RESOLVED | Noted: 2017-05-20 | Resolved: 2017-06-13

## 2017-06-13 PROCEDURE — 99214 OFFICE O/P EST MOD 30 MIN: CPT | Mod: S$PBB,,, | Performed by: PSYCHIATRY & NEUROLOGY

## 2017-06-13 PROCEDURE — 1159F MED LIST DOCD IN RCRD: CPT | Mod: ,,, | Performed by: PSYCHIATRY & NEUROLOGY

## 2017-06-13 PROCEDURE — 1125F AMNT PAIN NOTED PAIN PRSNT: CPT | Mod: ,,, | Performed by: PSYCHIATRY & NEUROLOGY

## 2017-06-13 PROCEDURE — 99999 PR PBB SHADOW E&M-EST. PATIENT-LVL III: CPT | Mod: PBBFAC,,, | Performed by: PSYCHIATRY & NEUROLOGY

## 2017-06-13 PROCEDURE — 99213 OFFICE O/P EST LOW 20 MIN: CPT | Mod: PBBFAC | Performed by: PSYCHIATRY & NEUROLOGY

## 2017-06-13 NOTE — PROGRESS NOTES
Vascular Neurology  Clinic Note    Reason For Visit (Chief Complaint): left pontine infarct 5/20/17    HPI: 81 y.o. right handed male with presentation on 5/15 as telemedicine for dizziness, admitted and worked up at [facility], discharged, then re-presented to [facility] for falls. MRI shows incomplete left medial pontomedullary infarct in small vessel patter w/ tegmentum involvement.     Patient's wife is present and they informed me that he continues to have significant problem with balance and falls. He did have another event where he suddenly stopped talking, gazed off and then passed out. During the ambulance right he came back and was OK by the time she got to the ED @ [facility]. They did repeat imaging of the brain (unclear what exactly) without any changes and currently clinically he is the same as when he left [facility] at discharge. He was using a walker inititally but now he is in a wheelchair for safe measure just given the falls. Currently getting PT/OT 2x/week.     Was taking ASA 81 prior to stroke, plavix was added during his stroke admission.    Brain Imaging:  MRI left incomplete deep/surface left pontomedullary junction infarct c/w small vessel etiology, significant PVWMH and remote lacunar infarcts bilaterally suggestive of severe small vessel disease  Vessel Imaging:   Carotid Ultrasound - <40% bilaterally  Cardiac Evaluation:  None  Other:   None  Relevant Labwork:    Recent Labs  Lab 05/20/17  1224   HEMOGLOBIN A1C 7.3 H   LDL CHOLESTEROL 66.8   HDL 35 L   TRIGLYCERIDES 201 H   CHOLESTEROL 142       I independently viewed the above imaging studies in addition to reviewing the report.  I reviewed the above labwork.    Review of Systems  Msk: pain TTP over R AC shoulder joint  Skin: negative for pruritis  Neuro: negative for headache  All others negative    Past Medical History  Past Medical History:   Diagnosis Date    CAD S/P percutaneous coronary angioplasty     Colon cancer     received radiation  "   Diabetes mellitus     High cholesterol 12/3/13    Hyperlipidemia     Hypertension     Prostate cancer      Family History  No relevant history   Social History  Former Smoker - quit 30 years     Medication List with Changes/Refills   Current Medications    ALBUTEROL 90 MCG/ACTUATION INHALER    Inhale 2 puffs into the lungs every 6 (six) hours as needed for Wheezing or Shortness of Breath.    AMLODIPINE (NORVASC) 10 MG TABLET    Take 10 mg by mouth once daily.    ASPIRIN (ECOTRIN) 325 MG EC TABLET    Take 325 mg by mouth once daily.    ATORVASTATIN (LIPITOR) 40 MG TABLET    Take 1 tablet (40 mg total) by mouth every evening.    CARVEDILOL (COREG) 25 MG TABLET    Take 25 mg by mouth 2 (two) times daily with meals.    CHLORTHALIDONE (HYGROTEN) 50 MG TAB    Take 2 tablets (100 mg total) by mouth once daily.    CLOPIDOGREL (PLAVIX) 75 MG TABLET    Take 1 tablet (75 mg total) by mouth once daily.    DONEPEZIL (ARICEPT) 10 MG TABLET    Take 10 mg by mouth 2 (two) times daily.    LISINOPRIL (PRINIVIL,ZESTRIL) 40 MG TABLET    Take 1 tablet (40 mg total) by mouth once daily.    MECLIZINE (ANTIVERT) 12.5 MG TABLET    Take 1 tablet (12.5 mg total) by mouth 3 (three) times daily as needed for Dizziness.    METFORMIN (GLUCOPHAGE) 500 MG TABLET    Take 1 tablet (500 mg total) by mouth 2 (two) times daily with meals.    MULTIVITAMIN CAPSULE    Take 1 capsule by mouth once daily.    OMEGA-3 FATTY ACIDS-VITAMIN E (FISH OIL) 1,000 MG CAP    Take 1,000 mg by mouth 2 (two) times daily.    OXYBUTYNIN (DITROPAN) 5 MG TAB    Take 5 mg by mouth 2 (two) times daily.    SPIRONOLACTONE (ALDACTONE) 25 MG TABLET    Take 25 mg by mouth once daily.       EXAM  Vital Signs:Blood pressure 136/65, pulse (!) 59, height 5' 11" (1.803 m), weight 117 kg (257 lb 15 oz).  General: well appearing without discomfort   Mental Status:alert, oriented to person - place - age - month   Language: able to name, repeat, comprehend commands   Cranial Nerves: " EOMI, PERRL, left decreased NLF, tongue to midline, palate midline  Motor: 5/5 power in all extremities, normal tone  Sensory: intact light touch bilaterally, intact proprioception bilaterally  Coordination: no ataxia on finger-to-nose or heel-to-shin testing  Gait & Stance: normal    NIH Stroke Scale:  Interval: baseline (upon arrival/admit)  Level of Consciousness: 0 - alert  LOC Questions: 0 - answers both correctly  LOC Commands: 0 - performs both correctly  Best Gaze: 0 - normal  Visual: 0 - no visual loss  Facial Palsy: 1 - minor  Motor Left Arm: 0 - no drift  Motor Right Arm: 0 - no drift  Motor Left Le - no drift  Motor Right Le - no drift  Limb Ataxia: 0 - absent  Sensory: 0 - normal  Best Language: 0 - no aphasia  Dysarthria: 0 - normal articulation  Extinction and Inattention: 0 - no neglect  NIH Stroke Scale Total: 1        MoCA not performed    ___________________  ASSESSMENT & PLAN    L paramedian pontomedullary small vessel ischemic stroke 17  Etiology: Small Vessel Occlusion Evident - small subcortical infarct of left paramedian pontomedullary junction involving the tegmentum  BRANDT Minor: hx of R vertebral artery stenosis of V4 segment from 2013, not anatomically correlating with new infarct -- CE Absent --  Major: small subcortical infarct in pattern consistent w/ small vessel penetrator -- Other Absent   · Diagnostic Orders: none  · Secondary stroke prevention: plavix 75 mg, discontinue ASA  · Continue current statin therapy atorvastatin 40  · Blood pressure goal < 130/80 mmHg   · Stroke Risk Factors Discussed & Addressed: HTN, HLD, DM  · Stroke education administered      Cerebrovascular small vessel disease    Type 2 diabetes mellitus with complication, without long-term current use of insulin    Mixed hyperlipidemia    Essential hypertension      MD Ekaterina  Vascular Neurology  Office [phone]  Fax [phone]

## 2017-06-13 NOTE — LETTER
June 13, 2017      [facility]  [address] [address]           [facility]  [address]  [place] [address]  Phone: [phone]          Patient: Nick Sellers   MR Number: [id number]   YOB: 1935   Date of Visit: 6/13/2017       Dear [facility]:    Thank you for referring Nick Sellers to me for evaluation. Attached you will find relevant portions of my assessment and plan of care.    If you have questions, please do not hesitate to call me. I look forward to following Nick Sellers along with you.    Sincerely,    Aleksandar Head MD    Enclosure  CC:  No Recipients    If you would like to receive this communication electronically, please contact [email or web address] or [phone] to request more information on Prot-On Link access.    For providers and/or their staff who would like to refer a patient to [facility], please contact us through our one-stop-shop provider referral line, Eddie Gordon, at [phone].    If you feel you have received this communication in error or would no longer like to receive these types of communications, please e-mail [email or web address]

## 2017-06-26 ENCOUNTER — TELEPHONE (OUTPATIENT)
Dept: NEUROLOGY | Facility: CLINIC | Age: 82
End: 2017-06-26

## 2017-06-26 NOTE — TELEPHONE ENCOUNTER
----- Message from Sabra Bourgeois sent at 6/26/2017 10:28 AM CDT -----  Contact: [facility]  This is a request for signature on home health orders. Please note the home health orders for this patient have been scanned into the MEDIA section of the patients chart under home health orders outside correspondence dated 6/26/17. Please have Dr. Parra  sign orders and have orders faxed back to [facility] at [phone] as soon as possible to assist the home care agency to stay within state compliance. Please provide us confirmation that the signed orders have been faxed for our records. If you have any questions regarding home care orders, please call Raeann Mott or Tila Angel [phone].

## 2017-07-05 ENCOUNTER — TELEPHONE (OUTPATIENT)
Dept: ADMINISTRATIVE | Facility: CLINIC | Age: 82
End: 2017-07-05

## 2019-01-08 NOTE — PT/OT/SLP DISCHARGE
Occupational Therapy Discharge Summary    Nick Sellers  MRN: [id number]   Principal Problem: Arterial ischemic stroke, vertebrobasilar, brainstem, acute      Patient Discharged from acute Occupational Therapy on 5/23/2017.  Please refer to prior OT note dated 5/23/2017 for functional status.    Assessment:      Patient appropriate for care in another setting.    Objective:     GOALS:   Multidisciplinary Problems     Occupational Therapy Goals        Problem: Occupational Therapy Goal    Goal Priority Disciplines Outcome Interventions   Occupational Therapy Goal     OT, PT/OT Ongoing (interventions implemented as appropriate)    Description:  Goals to be met by: 5/28/17     Patient will increase functional independence with ADLs by performing:    UE Dressing with Modified Green.  LE Dressing with Modified Green.  Grooming while standing with Modified Green.  Toileting from toilet with Modified Green for hygiene and clothing management.   Rolling to Bilateral with Modified Green.   Supine to sit with Modified Green.  Stand pivot transfers with Modified Green.                      Reasons for Discontinuation of Therapy Services  Transfer to alternate level of care.      Plan:     Patient Discharged to: Outpatient Therapy Services    JORDAN Real  1/8/2019

## 2019-06-30 ENCOUNTER — HOSPITAL ENCOUNTER (EMERGENCY)
Facility: HOSPITAL | Age: 84
Discharge: HOME | End: 2019-06-30
Attending: EMERGENCY MEDICINE
Payer: OTHER GOVERNMENT

## 2019-06-30 VITALS
OXYGEN SATURATION: 99 % | WEIGHT: 195 LBS | HEART RATE: 72 BPM | SYSTOLIC BLOOD PRESSURE: 163 MMHG | BODY MASS INDEX: 27.3 KG/M2 | RESPIRATION RATE: 18 BRPM | TEMPERATURE: 98 F | DIASTOLIC BLOOD PRESSURE: 76 MMHG | HEIGHT: 71 IN
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
## 2019-06-30 DIAGNOSIS — S22.42XA MULTIPLE FRACTURES OF RIBS, LEFT SIDE, INITIAL ENCOUNTER FOR CLOSED FRACTURE: Primary | ICD-10-CM

## 2019-06-30 PROCEDURE — 99283 EMERGENCY DEPT VISIT LOW MDM: CPT | Mod: ER

## 2019-06-30 PROCEDURE — 94799 UNLISTED PULMONARY SVC/PX: CPT | Mod: ER

## 2019-06-30 PROCEDURE — 25000003 PHARM REV CODE 250: Mod: ER | Performed by: PHYSICIAN ASSISTANT

## 2019-06-30 RX ORDER — HYDROCODONE BITARTRATE AND ACETAMINOPHEN 5; 325 MG/1; MG/1
1 TABLET ORAL
Status: COMPLETED | OUTPATIENT
Start: 2019-06-30 | End: 2019-06-30

## 2019-06-30 RX ORDER — HYDROCODONE BITARTRATE AND ACETAMINOPHEN 5; 325 MG/1; MG/1
1 TABLET ORAL EVERY 6 HOURS PRN
Qty: 12 TABLET | Refills: 0 | Status: SHIPPED | OUTPATIENT
Start: 2019-06-30

## 2019-06-30 RX ORDER — ORPHENADRINE CITRATE 100 MG/1
100 TABLET, EXTENDED RELEASE ORAL 2 TIMES DAILY
COMMUNITY
End: 2023-04-03 | Stop reason: ALTCHOICE

## 2019-06-30 RX ORDER — TRAMADOL HYDROCHLORIDE 50 MG/1
50 TABLET ORAL EVERY 12 HOURS PRN
COMMUNITY

## 2019-06-30 RX ADMIN — HYDROCODONE BITARTRATE AND ACETAMINOPHEN 1 TABLET: 5; 325 TABLET ORAL at 08:06

## 2019-07-01 NOTE — ED PROVIDER NOTES
Encounter Date: 2019       History     Chief Complaint   Patient presents with    Flank Pain     Patient reports he fell on Monday while at [employer], he reports he stepped down and lost his footing and fell into a bench and is c/o left flank pain, patient's daughter reports that she took patient to [facility] and an x-ray was done and it did not show anything. Patient was put on pain medications but the pain seem to be getting worse     Patient is an 83-year-old male with multiple comorbidities presenting with complaint of constant moderate to severe sharp pain in the left lateral and posterior ribs.  He reports that 6 days ago he lost his balance and fell at [employer] landing on his buttocks and then falling back into a bench.  He was seen at another emergency department and had negative x-rays of his back.  He was given a prescription for muscle relaxers and tramadol which are not helping.  The pain is worse with movement and deep inspiration.  No shortness of breath.  No fever.  No new injury.  No hematuria.        Review of patient's allergies indicates:   Allergen Reactions    Pcn [penicillins]      Patient states he has been paralyzed when given PCN in the past      Past Medical History:   Diagnosis Date    CAD S/P percutaneous coronary angioplasty     Colon cancer     received radiation    Diabetes mellitus     High cholesterol 12/3/13    Hyperlipidemia     Hypertension     Prostate cancer      Past Surgical History:   Procedure Laterality Date    CORONARY ANGIOPLASTY WITH STENT PLACEMENT      ,      Family History   Problem Relation Age of Onset    Throat cancer Mother     Prostate cancer Father     Diabetes Mellitus Brother     Diabetes Mellitus Sister     Stroke Neg Hx      Social History     Tobacco Use    Smoking status: Former Smoker     Last attempt to quit: 1998     Years since quittin.5   Substance Use Topics    Alcohol use: No    Drug use: No     Review of Systems    Constitutional: Negative for activity change, appetite change, chills and fever.   HENT: Negative for congestion, ear pain and sore throat.    Respiratory: Negative for cough, shortness of breath and wheezing.    Cardiovascular: Negative for chest pain and leg swelling.   Gastrointestinal: Negative for abdominal pain, diarrhea, nausea and vomiting.   Musculoskeletal: Negative for back pain, neck pain and neck stiffness.        + left lateral rib pain   Skin: Negative for rash and wound.   Neurological: Negative for dizziness, weakness, numbness and headaches.   All other systems reviewed and are negative.      Physical Exam     Initial Vitals [06/30/19 1956]   BP Pulse Resp Temp SpO2   (!) 195/90 60 18 97.9 °F (36.6 °C) 97 %      MAP       --         Physical Exam    Nursing note and vitals reviewed.  Constitutional: He appears well-developed and well-nourished.   HENT:   Head: Normocephalic and atraumatic.   Nose: Nose normal.   Mouth/Throat: Oropharynx is clear and moist.   Eyes: Conjunctivae and EOM are normal. Pupils are equal, round, and reactive to light.   Neck: Normal range of motion. Neck supple.   Cardiovascular: Normal rate, regular rhythm, normal heart sounds and intact distal pulses.   Pulmonary/Chest: Breath sounds normal. No respiratory distress. He has no wheezes. He has no rhonchi. He has no rales.   Left lateral and posterior ribs tender to palpation. Pain with rotation of spine.  No midline or spinous tenderness.   Abdominal: Soft. Bowel sounds are normal. He exhibits no distension. There is no tenderness. There is no rebound and no guarding.   No CVA tenderness   Musculoskeletal: He exhibits no edema.   Lymphadenopathy:     He has no cervical adenopathy.   Neurological: He is alert and oriented to person, place, and time.   Skin: Skin is warm and dry. No rash noted.   No abrasions or lacerations   Psychiatric: He has a normal mood and affect. His behavior is normal. Judgment and thought content  normal.         ED Course   Procedures  Labs Reviewed - No data to display       Imaging Results          X-Ray Ribs 2 View Left (Final result)  Result time 06/30/19 21:08:04    Final result by Joey Burton MD (06/30/19 21:08:04)                 Impression:      There are fractures of the left 11 th and 12 th ribs.      Electronically signed by: Joey Burton  Date:    06/30/2019  Time:    21:08             Narrative:    EXAMINATION:  XR RIBS 2 VIEW LEFT    CLINICAL HISTORY:  Unspecified fall, initial encounter    TECHNIQUE:  Two views of the left ribs were performed.    COMPARISON:  None.    FINDINGS:  The heart is normal in size.  No pneumothorax is demonstrated.  Her fractures running through the mid to distal left 11 th and 12 th ribs with only minimal displacement.  Some blunting of left costophrenic angle suggest presence of a small left-sided pleural effusion.                                 Medical Decision Making:   Clinical Tests:   Radiological Study: Ordered and Reviewed  Fracture 11/10/2012 posterior ribs on the left.  Tramadol is not helping.  Patient was given a prescription for Norco after I discussed at length the increased fall risk with the patient and his family.  They will monitoring very closely while he is taking the medication.  He was given an incentive spirometer and instructions.  Follow-up with PCP within 2 days.  Return to the ED for fever, shortness of breath, severe pain or worse in any way.                      Clinical Impression:       ICD-10-CM ICD-9-CM   1. Multiple fractures of ribs, left side, initial encounter for closed fracture S22.42XA 807.09   2. Fall W19.XXXA E888.9         Disposition:   Disposition: Discharged                        ZOIE Dumont  06/30/19 1636

## 2019-07-01 NOTE — ED NOTES
"" I fell Last Monday at [facility], I landed on my butt and fell backward but did not hit my head. I have been falling a lot at home from my legs not that I pass out." pain is getting worst Went to [place] Wednesday and was x ray and given muscle relaxer but its not working at all." Luiza LINO at bedside.  "

## 2019-12-22 ENCOUNTER — OUTSIDE PLACE OF SERVICE (OUTPATIENT)
Dept: CARDIOLOGY | Facility: CLINIC | Age: 84
End: 2019-12-22
Payer: OTHER GOVERNMENT

## 2019-12-22 PROCEDURE — 93010 ELECTROCARDIOGRAM REPORT: CPT | Mod: ,,, | Performed by: INTERNAL MEDICINE

## 2019-12-22 PROCEDURE — 93010 PR ELECTROCARDIOGRAM REPORT: ICD-10-PCS | Mod: ,,, | Performed by: INTERNAL MEDICINE

## 2020-06-03 ENCOUNTER — OUTSIDE PLACE OF SERVICE (OUTPATIENT)
Dept: CARDIOLOGY | Facility: CLINIC | Age: 85
End: 2020-06-03
Payer: OTHER GOVERNMENT

## 2020-06-03 PROCEDURE — 93010 PR ELECTROCARDIOGRAM REPORT: ICD-10-PCS | Mod: ,,, | Performed by: INTERNAL MEDICINE

## 2020-06-03 PROCEDURE — 93010 ELECTROCARDIOGRAM REPORT: CPT | Mod: ,,, | Performed by: INTERNAL MEDICINE

## 2020-12-18 ENCOUNTER — OUTSIDE PLACE OF SERVICE (OUTPATIENT)
Dept: CARDIOLOGY | Facility: CLINIC | Age: 85
End: 2020-12-18
Payer: OTHER GOVERNMENT

## 2020-12-18 PROCEDURE — 93010 PR ELECTROCARDIOGRAM REPORT: ICD-10-PCS | Mod: ,,, | Performed by: INTERNAL MEDICINE

## 2020-12-18 PROCEDURE — 93010 ELECTROCARDIOGRAM REPORT: CPT | Mod: ,,, | Performed by: INTERNAL MEDICINE

## 2021-05-06 ENCOUNTER — PATIENT MESSAGE (OUTPATIENT)
Dept: RESEARCH | Facility: HOSPITAL | Age: 86
End: 2021-05-06

## 2022-12-02 ENCOUNTER — PATIENT MESSAGE (OUTPATIENT)
Dept: RESEARCH | Facility: HOSPITAL | Age: 87
End: 2022-12-02
Payer: OTHER GOVERNMENT

## 2023-04-03 ENCOUNTER — HOSPITAL ENCOUNTER (EMERGENCY)
Facility: HOSPITAL | Age: 88
Discharge: HOME | End: 2023-04-03
Attending: FAMILY MEDICINE
Payer: OTHER GOVERNMENT

## 2023-04-03 VITALS
OXYGEN SATURATION: 97 % | SYSTOLIC BLOOD PRESSURE: 189 MMHG | RESPIRATION RATE: 18 BRPM | HEIGHT: 71 IN | BODY MASS INDEX: 37.52 KG/M2 | HEART RATE: 70 BPM | TEMPERATURE: 98 F | DIASTOLIC BLOOD PRESSURE: 84 MMHG | WEIGHT: 268 LBS

## 2023-04-03 DIAGNOSIS — M25.551 RIGHT HIP PAIN: ICD-10-CM

## 2023-04-03 PROCEDURE — 25000003 PHARM REV CODE 250: Mod: ER

## 2023-04-03 PROCEDURE — 99284 EMERGENCY DEPT VISIT MOD MDM: CPT | Mod: ER

## 2023-04-03 RX ORDER — LIDOCAINE 50 MG/G
1 PATCH TOPICAL ONCE
Status: DISCONTINUED | OUTPATIENT
Start: 2023-04-03 | End: 2023-04-03 | Stop reason: HOSPADM

## 2023-04-03 RX ORDER — METHOCARBAMOL 500 MG/1
500 TABLET, FILM COATED ORAL 4 TIMES DAILY
Qty: 20 TABLET | Refills: 0 | Status: SHIPPED | OUTPATIENT
Start: 2023-04-03 | End: 2023-04-08

## 2023-04-03 RX ORDER — ACETAMINOPHEN 500 MG
1000 TABLET ORAL
Status: COMPLETED | OUTPATIENT
Start: 2023-04-03 | End: 2023-04-03

## 2023-04-03 RX ORDER — LIDOCAINE 50 MG/G
1 PATCH TOPICAL DAILY
Qty: 10 PATCH | Refills: 0 | Status: SHIPPED | OUTPATIENT
Start: 2023-04-03

## 2023-04-03 RX ADMIN — ACETAMINOPHEN 1000 MG: 500 TABLET ORAL at 11:04

## 2023-04-03 RX ADMIN — LIDOCAINE 1 PATCH: 50 PATCH TOPICAL at 11:04

## 2023-04-03 NOTE — ED PROVIDER NOTES
Encounter Date: 4/3/2023       History     Chief Complaint   Patient presents with    Hip Pain     Right hip pain started last night worsened over night . Difficulty picking right leg up. Denies injury or fall, radiating pain. 10/10     Patient is 87-year-old male with a history of diabetes, colon cancer, and hypertension who presents to the ED with right hip pain onset last night.  Patient denies any injury or trauma.  Patient reports pain when he is lying down and picks up his right leg.  He is not taken any medications at home.     A ten point review of systems was completed and is negative except as documented above.  Patient denies any other acute medical complaint.    The patients available PMH, PSH, Social History, medications, allergies, and triage vital signs were reviewed just prior to their medical evaluation.      The history is provided by the patient.   Review of patient's allergies indicates:   Allergen Reactions    Pcn [penicillins]      Patient states he has been paralyzed when given PCN in the past      Past Medical History:   Diagnosis Date    CAD S/P percutaneous coronary angioplasty     Colon cancer     received radiation    Diabetes mellitus     High cholesterol 12/3/13    Hyperlipidemia     Hypertension     Prostate cancer      Past Surgical History:   Procedure Laterality Date    CORONARY ANGIOPLASTY WITH STENT PLACEMENT      ,      Family History   Problem Relation Age of Onset    Throat cancer Mother     Prostate cancer Father     Diabetes Mellitus Brother     Diabetes Mellitus Sister     Stroke Neg Hx      Social History     Tobacco Use    Smoking status: Former     Types: Cigarettes     Quit date: 1998     Years since quittin.3   Substance Use Topics    Alcohol use: No    Drug use: No     Review of Systems   Constitutional:  Negative for fever.   HENT:  Negative for sore throat.    Respiratory:  Negative for shortness of breath.    Cardiovascular:  Negative for chest  pain.   Gastrointestinal:  Negative for nausea.   Genitourinary:  Negative for dysuria.   Musculoskeletal:  Negative for back pain, gait problem and neck pain.        Right hip pain   Skin:  Negative for rash.   Neurological:  Negative for weakness.   Hematological:  Does not bruise/bleed easily.   Psychiatric/Behavioral:  Negative for agitation and behavioral problems.      Physical Exam     Initial Vitals [04/03/23 1039]   BP Pulse Resp Temp SpO2   (!) 215/86 82 18 97.9 °F (36.6 °C) 96 %      MAP       --         Physical Exam    Nursing note and vitals reviewed.  Constitutional: He appears well-developed and well-nourished. No distress.   HENT:   Head: Normocephalic and atraumatic.   Eyes: Pupils are equal, round, and reactive to light. Right eye exhibits no discharge. Left eye exhibits no discharge.   Neck: Neck supple.   Cardiovascular:  Normal rate and regular rhythm.           Pulmonary/Chest: No respiratory distress.   Musculoskeletal:         General: Normal range of motion.      Cervical back: Neck supple.      Comments: Full ROM of right hip on exam, no TTP, sensation intact, patient able to ambulate without difficulty, no shortening     Neurological: He is alert and oriented to person, place, and time.   Skin: Skin is warm and dry.   Psychiatric: He has a normal mood and affect. His behavior is normal.       ED Course   Procedures  Labs Reviewed - No data to display       Imaging Results              X-Ray Hip 2 or 3 views Right (with Pelvis when performed) (Final result)  Result time 04/03/23 12:44:22      Final result by AYSHA Alan Sr., MD (04/03/23 12:44:22)                   Impression:      1. The joint space of the right hip is normal in appearance.  2. There is a moderate amount of atherosclerosis.      Electronically signed by: Salvatore Alan MD  Date:    04/03/2023  Time:    12:44               Narrative:    EXAMINATION:  XR HIP WITH PELVIS WHEN PERFORMED, 2 OR 3  VIEWS RIGHT    CLINICAL  HISTORY:  Pain in right hip    COMPARISON:  None    FINDINGS:  There is no fracture. There is no dislocation.  The joint space of the right hip is normal in appearance.  There is a moderate amount of atherosclerosis.                                       Medications   LIDOcaine 5 % patch 1 patch (1 patch Transdermal Patch Applied 4/3/23 1129)   acetaminophen tablet 1,000 mg (1,000 mg Oral Given 4/3/23 1130)     Medical Decision Making:   Initial Assessment:   Right hip pain onset last night  Differential Diagnosis:   Musculoskeletal pain, muscle strain, ligament tear/sprain, fracture, dislocation, arthritis     Clinical Tests:   Radiological Study: Ordered and Reviewed  ED Management:  Right hip pain  -Afebrile, vital signs stable, no apparent distress  -Right hip xray resulted joint space in normal appearance, moderate atherosclerosis, I reviewed the images and agree with the radiologist's interpretation  -Patient reports relief with Lidoderm patch and tylenol in the ED    Plan:  -Robaxin as prescribed  -Lidoderm patches and tylenol as needed  -Patient is in stable condition to be discharged home. Advised patient to follow up with primary care doctor and return to the ED if experiencing any worsening of symptoms.                           Clinical Impression:   Final diagnoses:  [M25.551] Right hip pain        ED Disposition Condition    Discharge Stable          ED Prescriptions       Medication Sig Dispense Start Date End Date Auth. Provider    methocarbamoL (ROBAXIN) 500 MG Tab Take 1 tablet (500 mg total) by mouth 4 (four) times daily. for 5 days 20 tablet 4/3/2023 4/8/2023 Nevin Perales PA-C    LIDOcaine (LIDODERM) 5 % Place 1 patch onto the skin once daily. Remove & Discard patch within 12 hours or as directed by MD 10 patch 4/3/2023 -- Nevin Perales PA-C          Follow-up Information    None          Nevin Perales PA-C  04/03/23 1301

## 2023-04-03 NOTE — DISCHARGE INSTRUCTIONS
-Follow up with primary care doctor and return to the ER if you are experiencing any worsening of symptoms    Thank you for letting myself and our team take care for you today! It was nice meeting you, and I hope you feel better very soon. Please come back to [facility] for all of your future medical needs.   Our goal in the ER is to always give you outstanding care and exceptional service. You may receive a survey by mail or email in the next week about your experience in our ED. We would greatly appreciate you completing and returning the survey. Your feedback provides us with a way to recognize our staff who give very good care and it helps us learn how to improve when your experience was below our aspiration of excellence.     Sincerely,     Nevin Perales PA-C  Emergency Room Physician Assistant  [facility]

## 2024-02-14 NOTE — HOSPITAL COURSE
Found to have left facial droop and balance disturbance at admission. MRI of brain showed pontine and medullary punctate infarcts. Also noted by staff on review of MRI of from [place] but unable to see any cerebellar infarcts per OSH report. 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 He did not get tPA or thrombectomy. Evaluated by PT/OT who felt disposition with home health was appropriate. After discussion with patient's family, decided to return tot home health PT/OT. Discussion about NH placement if felt unable to care for at home which patient and family adamantly refused. Reported some blurry vision he had present prior to his stroke. No visual field cuts noted and able to correctly identify number of fingers on exam. No etiology found from stroke perspective. He stated he had an ophthalmologist he would prefer to follow-up with.    14-Feb-2024 18:15